# Patient Record
Sex: FEMALE | NOT HISPANIC OR LATINO | URBAN - METROPOLITAN AREA
[De-identification: names, ages, dates, MRNs, and addresses within clinical notes are randomized per-mention and may not be internally consistent; named-entity substitution may affect disease eponyms.]

---

## 2023-05-08 ENCOUNTER — OFFICE VISIT (OUTPATIENT)
Dept: PRIMARY CARE | Facility: CLINIC | Age: 68
End: 2023-05-08
Payer: MEDICARE

## 2023-05-08 VITALS
DIASTOLIC BLOOD PRESSURE: 70 MMHG | BODY MASS INDEX: 24.34 KG/M2 | SYSTOLIC BLOOD PRESSURE: 146 MMHG | WEIGHT: 170 LBS | HEIGHT: 70 IN

## 2023-05-08 DIAGNOSIS — R31.9 HEMATURIA, UNSPECIFIED TYPE: Primary | ICD-10-CM

## 2023-05-08 DIAGNOSIS — R10.9 ABDOMINAL PAIN, UNSPECIFIED ABDOMINAL LOCATION: ICD-10-CM

## 2023-05-08 DIAGNOSIS — M79.3 PANNICULITIS: ICD-10-CM

## 2023-05-08 DIAGNOSIS — M67.431 GANGLION CYST OF DORSUM OF RIGHT WRIST: ICD-10-CM

## 2023-05-08 PROBLEM — R53.83 FATIGUE: Status: ACTIVE | Noted: 2023-05-08

## 2023-05-08 PROBLEM — E03.9 HYPOTHYROIDISM: Status: ACTIVE | Noted: 2023-05-08

## 2023-05-08 PROBLEM — R42 DIZZINESS: Status: ACTIVE | Noted: 2023-05-08

## 2023-05-08 PROBLEM — K11.20 PAROTITIS: Status: ACTIVE | Noted: 2023-05-08

## 2023-05-08 PROBLEM — E78.5 HYPERLIPIDEMIA: Status: ACTIVE | Noted: 2023-05-08

## 2023-05-08 PROBLEM — K21.9 ESOPHAGEAL REFLUX: Status: ACTIVE | Noted: 2023-05-08

## 2023-05-08 PROBLEM — F41.9 ANXIETY: Status: ACTIVE | Noted: 2023-05-08

## 2023-05-08 PROBLEM — N39.0 UTI (URINARY TRACT INFECTION): Status: ACTIVE | Noted: 2023-05-08

## 2023-05-08 PROBLEM — M19.91 PRIMARY LOCALIZED OSTEOARTHRITIS: Status: ACTIVE | Noted: 2023-05-08

## 2023-05-08 PROBLEM — N89.8 VAGINAL DISCHARGE: Status: ACTIVE | Noted: 2023-05-08

## 2023-05-08 PROBLEM — R42 LIGHTHEADEDNESS: Status: ACTIVE | Noted: 2023-05-08

## 2023-05-08 PROBLEM — M19.049 ARTHRITIS OF HAND: Status: ACTIVE | Noted: 2023-05-08

## 2023-05-08 PROBLEM — M89.9 DISORDER OF BONE AND ARTICULAR CARTILAGE: Status: ACTIVE | Noted: 2023-05-08

## 2023-05-08 PROBLEM — I10 HYPERTENSION: Status: ACTIVE | Noted: 2023-05-08

## 2023-05-08 PROBLEM — J20.9 ACUTE BRONCHITIS: Status: ACTIVE | Noted: 2023-05-08

## 2023-05-08 PROBLEM — B00.1 HERPES LABIALIS: Status: ACTIVE | Noted: 2023-05-08

## 2023-05-08 PROBLEM — M81.0 AGE RELATED OSTEOPOROSIS: Status: ACTIVE | Noted: 2023-05-08

## 2023-05-08 PROBLEM — R73.01 IMPAIRED FASTING GLUCOSE: Status: ACTIVE | Noted: 2023-05-08

## 2023-05-08 PROBLEM — K64.8 INTERNAL HEMORRHOIDS: Status: ACTIVE | Noted: 2023-05-08

## 2023-05-08 PROBLEM — M25.512 SHOULDER PAIN, LEFT: Status: ACTIVE | Noted: 2023-05-08

## 2023-05-08 PROBLEM — M79.645 PAIN OF LEFT THUMB: Status: ACTIVE | Noted: 2023-05-08

## 2023-05-08 PROBLEM — R63.5 WEIGHT GAIN: Status: ACTIVE | Noted: 2023-05-08

## 2023-05-08 PROBLEM — R93.0 ABNORMAL HEAD CT: Status: ACTIVE | Noted: 2023-05-08

## 2023-05-08 PROBLEM — G56.03 BILATERAL CARPAL TUNNEL SYNDROME: Status: ACTIVE | Noted: 2023-05-08

## 2023-05-08 PROBLEM — M19.042 ARTHRITIS OF HAND, LEFT, DEGENERATIVE: Status: ACTIVE | Noted: 2023-05-08

## 2023-05-08 PROBLEM — I25.10 CORONARY ARTERY ARTERIOSCLEROSIS: Status: ACTIVE | Noted: 2023-05-08

## 2023-05-08 PROBLEM — M94.9 DISORDER OF BONE AND ARTICULAR CARTILAGE: Status: ACTIVE | Noted: 2023-05-08

## 2023-05-08 PROBLEM — B00.9 HSV (HERPES SIMPLEX VIRUS) INFECTION: Status: ACTIVE | Noted: 2023-05-08

## 2023-05-08 PROBLEM — R21 RASH: Status: ACTIVE | Noted: 2023-05-08

## 2023-05-08 PROBLEM — S93.401A SPRAIN OF ANKLE, RIGHT: Status: ACTIVE | Noted: 2023-05-08

## 2023-05-08 PROBLEM — R63.4 WEIGHT LOSS: Status: ACTIVE | Noted: 2023-05-08

## 2023-05-08 PROBLEM — M79.606 LOWER EXTREMITY PAIN: Status: ACTIVE | Noted: 2023-05-08

## 2023-05-08 PROBLEM — I25.9 ASYMPTOMATIC CORONARY HEART DISEASE: Status: ACTIVE | Noted: 2023-05-08

## 2023-05-08 PROBLEM — J30.2 SEASONAL ALLERGIC RHINITIS: Status: ACTIVE | Noted: 2023-05-08

## 2023-05-08 PROBLEM — N95.1 POST MENOPAUSAL SYNDROME: Status: ACTIVE | Noted: 2023-05-08

## 2023-05-08 PROBLEM — M19.049 OSTEOARTHRITIS, HAND: Status: ACTIVE | Noted: 2023-05-08

## 2023-05-08 PROBLEM — R52 GENERALIZED BODY ACHES: Status: ACTIVE | Noted: 2023-05-08

## 2023-05-08 PROBLEM — M79.7 FIBROMYALGIA SYNDROME: Status: ACTIVE | Noted: 2023-05-08

## 2023-05-08 PROBLEM — R79.89 ABNORMAL LFTS (LIVER FUNCTION TESTS): Status: ACTIVE | Noted: 2023-05-08

## 2023-05-08 PROBLEM — F32.A DEPRESSION: Status: ACTIVE | Noted: 2023-05-08

## 2023-05-08 PROBLEM — K63.5 BENIGN COLON POLYP: Status: ACTIVE | Noted: 2023-05-08

## 2023-05-08 PROBLEM — J06.9 UPPER RESPIRATORY INFECTION: Status: ACTIVE | Noted: 2023-05-08

## 2023-05-08 PROBLEM — R55 SYNCOPE: Status: ACTIVE | Noted: 2023-05-08

## 2023-05-08 PROBLEM — R93.1 ELEVATED CORONARY ARTERY CALCIUM SCORE: Status: ACTIVE | Noted: 2023-05-08

## 2023-05-08 LAB
POC APPEARANCE, URINE: CLEAR
POC BILIRUBIN, URINE: NEGATIVE
POC BLOOD, URINE: ABNORMAL
POC COLOR, URINE: YELLOW
POC GLUCOSE, URINE: NEGATIVE MG/DL
POC KETONES, URINE: NEGATIVE MG/DL
POC LEUKOCYTES, URINE: NEGATIVE
POC NITRITE,URINE: NEGATIVE
POC PH, URINE: 5.5 PH
POC PROTEIN, URINE: NEGATIVE MG/DL
POC SPECIFIC GRAVITY, URINE: <=1.005
POC UROBILINOGEN, URINE: 0.2 EU/DL

## 2023-05-08 PROCEDURE — 1159F MED LIST DOCD IN RCRD: CPT | Performed by: INTERNAL MEDICINE

## 2023-05-08 PROCEDURE — 81002 URINALYSIS NONAUTO W/O SCOPE: CPT | Performed by: INTERNAL MEDICINE

## 2023-05-08 PROCEDURE — 3077F SYST BP >= 140 MM HG: CPT | Performed by: INTERNAL MEDICINE

## 2023-05-08 PROCEDURE — 99214 OFFICE O/P EST MOD 30 MIN: CPT | Performed by: INTERNAL MEDICINE

## 2023-05-08 PROCEDURE — 3078F DIAST BP <80 MM HG: CPT | Performed by: INTERNAL MEDICINE

## 2023-05-08 RX ORDER — LEVOTHYROXINE SODIUM 100 UG/1
100 TABLET ORAL DAILY
COMMUNITY
End: 2023-07-10 | Stop reason: ALTCHOICE

## 2023-05-08 RX ORDER — DULOXETIN HYDROCHLORIDE 60 MG/1
60 CAPSULE, DELAYED RELEASE ORAL DAILY
COMMUNITY
Start: 2023-04-24 | End: 2023-07-13 | Stop reason: SDUPTHER

## 2023-05-08 RX ORDER — MULTIVITAMIN
1 TABLET ORAL DAILY
COMMUNITY

## 2023-05-08 RX ORDER — ATORVASTATIN CALCIUM 20 MG/1
20 TABLET, FILM COATED ORAL DAILY
COMMUNITY
End: 2023-07-13 | Stop reason: SDUPTHER

## 2023-05-08 RX ORDER — CIPROFLOXACIN 500 MG/1
500 TABLET ORAL 2 TIMES DAILY
Qty: 20 TABLET | Refills: 0 | Status: SHIPPED | OUTPATIENT
Start: 2023-05-08 | End: 2023-05-18

## 2023-05-08 RX ORDER — METRONIDAZOLE 500 MG/1
500 TABLET ORAL 3 TIMES DAILY
Qty: 30 TABLET | Refills: 0 | Status: SHIPPED | OUTPATIENT
Start: 2023-05-08 | End: 2023-05-18

## 2023-05-08 RX ORDER — VALACYCLOVIR HYDROCHLORIDE 1 G/1
TABLET, FILM COATED ORAL
COMMUNITY
Start: 2020-02-04 | End: 2023-10-02 | Stop reason: ALTCHOICE

## 2023-05-08 RX ORDER — FLUTICASONE PROPIONATE 50 MCG
2 SPRAY, SUSPENSION (ML) NASAL DAILY
COMMUNITY
End: 2023-07-13 | Stop reason: SDUPTHER

## 2023-05-08 RX ORDER — ASPIRIN 81 MG/1
81 TABLET ORAL DAILY
COMMUNITY

## 2023-05-08 RX ORDER — OLMESARTAN MEDOXOMIL 20 MG/1
20 TABLET ORAL DAILY
COMMUNITY
End: 2023-07-13 | Stop reason: SDUPTHER

## 2023-05-08 RX ORDER — CETIRIZINE HYDROCHLORIDE 10 MG/1
1 TABLET ORAL DAILY
COMMUNITY
Start: 2018-06-05

## 2023-05-08 NOTE — PROGRESS NOTES
Subjective   Patient ID: Rebecca Trinidad is a 68 y.o. female who presents for Abdominal Pain (Rt side on going x5days).    HPI   Patient started complaining of having right-sided abdominal pain since Wednesday it was very sharp lasted for few hours since then it is just mild but not going away.  She is constipated no diarrhea denies any nausea vomiting.  Denies any urinary frequency or burning.  Denies any fever  Concerned about appendicitis  Complaining of cyst on the back of the right wrist    Past recap    Patient is here for follow-up on blood work  Needs medication refill  Follow-up on hypothyroidism hypertension high cholesterol  Patient is still taking Cymbalta and needs refill. She was using her daughter's prescription so did not prescription from us  Patient is also complaining of having abdominal cramping from time to time  She does have anxiety issues which makes cramping worse. Sometimes she gets diarrhea sometimes constipation. She does not eat much fiber in  She has history of colon polyps and overdue for colonoscopy    past recap  having pain in left shoulder , going on for a while   has fibromyalgia   no injury , pain in movement up and back   sometimes heavy lifting at working , lot of repititive work      She does not have insurance  Sometimes gets tingling in the lower lip even though she does not have a sore so she takes Valtrex and it helps  October 24 she went to urgent care because she was having dizziness told fluid in the ER treated with Z-Don her right ear was hurting still she is feeling dizzy she was off work for 1 week  She is still getting off balance and spinning sensation      Patient here to establish  Needs refills on medications  Follow-up on hypothyroidism hypertension high cholesterol  Patient does not have insurance does not want you out of work  She will have insurance next year    Past medical history: Fibromyalgia, colon polyp, osteoarthritis, Hashimoto's disease,  "hypothyroidism, hypertension, high cholesterol, right ankle surgery, right thumb surgery, carpal tunnel surgery  Social history: Smokes drinks moderate alcohol  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   Constitutional:       Appearance: Normal appearance.   HENT:      Head: Normocephalic and atraumatic.      Right Ear: Tympanic membrane, ear canal and external ear normal.      Left Ear: Tympanic membrane, ear canal and external ear normal.      Nose: Nose normal.      Mouth/Throat:      Pharynx: Oropharynx is clear.   Eyes:      Extraocular Movements: Extraocular movements intact.      Conjunctiva/sclera: Conjunctivae normal.      Pupils: Pupils are equal, round, and reactive to light.   Cardiovascular:      Rate and Rhythm: Normal rate and regular rhythm.      Pulses: Normal pulses.      Heart sounds: Normal heart sounds.   Pulmonary:      Effort: Pulmonary effort is normal.      Breath sounds: Normal breath sounds.   Abdominal:      General: Abdomen is flat. Bowel sounds are normal.      Palpations: Abdomen is soft.      Tenderness: There is abdominal tenderness.      Comments: Tenderness in right lower quadrant   Musculoskeletal:      Cervical back: Normal range of motion and neck supple.   Skin:     General: Skin is warm and dry.   Neurological:      General: No focal deficit present.      Mental Status: She is alert and oriented to person, place, and time.   Psychiatric:         Mood and Affect: Mood normal.         Assessment/Plan   Problem List Items Addressed This Visit          Nervous    Abdominal pain    Relevant Medications    ciprofloxacin (Cipro) 500 mg tablet    metroNIDAZOLE (Flagyl) 500 mg tablet    Other Relevant Orders    POCT UA (nonautomated) manually resulted " (Completed)    CT abdomen pelvis wo IV contrast (Completed)       Other    Hematuria - Primary    Relevant Orders    CT abdomen pelvis wo IV contrast (Completed)     Other Visit Diagnoses       Panniculitis        Relevant Medications    ciprofloxacin (Cipro) 500 mg tablet    metroNIDAZOLE (Flagyl) 500 mg tablet    Ganglion cyst of dorsum of right wrist                 4/11  Mammogram ordered  Blood work ordered  EKG done shows normal sinus rhythm  Colonoscopy ordered  Pneumonia vaccine ordered  Shingles vaccine ordered  Blood work ordered CBC CMP fasting with TSH  X-ray of the left shoulder ordered  Reviewed x-ray results no osteoarthritis  Symptoms most likely from biceps tendinitis  Discussed stretching exercises  Medications refilled for 3 months  Follow-up after the testing    7/18  CT cardiac scoring showed score of more than 300  Patient is still smoking  Discussed risk factors for heart disease  Advised to quit smoking  Ultrasound kidneys normal  X-ray shoulder shows arthritis  Advised to continue shoulder therapy  Blood work is all in range  Thyroid in range  Blood pressure stable  Refills given on allergy medication    1/23/23   blood pressure stable  Blood work reviewed  Cholesterol well controlled  Colonoscopy results reviewed  No diverticular disease  Symptoms most likely IBS  Advised to increase fiber in the diet  Refer to screening colonoscopy  Refills given on Cymbalta  Follow-up blood work in 6 months     5/8/2023  Urine analysis done shows blood  Will get CT of the abdomen and pelvis stat  CAT scan reviewed no hematuria  Patient has panniculitis which can be causing the pain  With Cipro and Flagyl  Severe constipation  Discussed stool softeners and bowel regimen to help with the  6 cyst on the wrist is ganglion cyst benign

## 2023-07-08 LAB
ALANINE AMINOTRANSFERASE (SGPT) (U/L) IN SER/PLAS: 24 U/L (ref 7–45)
ALBUMIN (G/DL) IN SER/PLAS: 4.5 G/DL (ref 3.4–5)
ALKALINE PHOSPHATASE (U/L) IN SER/PLAS: 64 U/L (ref 33–136)
ANION GAP IN SER/PLAS: 9 MMOL/L (ref 10–20)
ASPARTATE AMINOTRANSFERASE (SGOT) (U/L) IN SER/PLAS: 22 U/L (ref 9–39)
BILIRUBIN TOTAL (MG/DL) IN SER/PLAS: 0.6 MG/DL (ref 0–1.2)
CALCIUM (MG/DL) IN SER/PLAS: 10.2 MG/DL (ref 8.6–10.6)
CARBON DIOXIDE, TOTAL (MMOL/L) IN SER/PLAS: 30 MMOL/L (ref 21–32)
CHLORIDE (MMOL/L) IN SER/PLAS: 102 MMOL/L (ref 98–107)
CHOLESTEROL (MG/DL) IN SER/PLAS: 157 MG/DL (ref 0–199)
CHOLESTEROL IN HDL (MG/DL) IN SER/PLAS: 52.7 MG/DL
CHOLESTEROL/HDL RATIO: 3
CREATININE (MG/DL) IN SER/PLAS: 0.97 MG/DL (ref 0.5–1.05)
GFR FEMALE: 64 ML/MIN/1.73M2
GLUCOSE (MG/DL) IN SER/PLAS: 101 MG/DL (ref 74–99)
LDL: 80 MG/DL (ref 0–99)
POTASSIUM (MMOL/L) IN SER/PLAS: 4.4 MMOL/L (ref 3.5–5.3)
PROTEIN TOTAL: 6.9 G/DL (ref 6.4–8.2)
SODIUM (MMOL/L) IN SER/PLAS: 137 MMOL/L (ref 136–145)
THYROTROPIN (MIU/L) IN SER/PLAS BY DETECTION LIMIT <= 0.05 MIU/L: 0.12 MIU/L (ref 0.44–3.98)
TRIGLYCERIDE (MG/DL) IN SER/PLAS: 123 MG/DL (ref 0–149)
UREA NITROGEN (MG/DL) IN SER/PLAS: 17 MG/DL (ref 6–23)
VLDL: 25 MG/DL (ref 0–40)

## 2023-07-10 ENCOUNTER — OFFICE VISIT (OUTPATIENT)
Dept: PRIMARY CARE | Facility: CLINIC | Age: 68
End: 2023-07-10
Payer: MEDICARE

## 2023-07-10 VITALS
SYSTOLIC BLOOD PRESSURE: 132 MMHG | HEIGHT: 70 IN | WEIGHT: 165 LBS | BODY MASS INDEX: 23.62 KG/M2 | DIASTOLIC BLOOD PRESSURE: 64 MMHG

## 2023-07-10 DIAGNOSIS — I10 PRIMARY HYPERTENSION: ICD-10-CM

## 2023-07-10 DIAGNOSIS — K59.09 OTHER CONSTIPATION: ICD-10-CM

## 2023-07-10 DIAGNOSIS — E03.9 HYPOTHYROIDISM, UNSPECIFIED TYPE: Primary | ICD-10-CM

## 2023-07-10 DIAGNOSIS — E78.2 MIXED HYPERLIPIDEMIA: ICD-10-CM

## 2023-07-10 PROCEDURE — 3075F SYST BP GE 130 - 139MM HG: CPT | Performed by: INTERNAL MEDICINE

## 2023-07-10 PROCEDURE — 99213 OFFICE O/P EST LOW 20 MIN: CPT | Performed by: INTERNAL MEDICINE

## 2023-07-10 PROCEDURE — 1159F MED LIST DOCD IN RCRD: CPT | Performed by: INTERNAL MEDICINE

## 2023-07-10 PROCEDURE — 3078F DIAST BP <80 MM HG: CPT | Performed by: INTERNAL MEDICINE

## 2023-07-12 ENCOUNTER — TELEPHONE (OUTPATIENT)
Dept: PRIMARY CARE | Facility: CLINIC | Age: 68
End: 2023-07-12
Payer: MEDICARE

## 2023-07-12 DIAGNOSIS — E78.5 HYPERLIPIDEMIA, UNSPECIFIED HYPERLIPIDEMIA TYPE: ICD-10-CM

## 2023-07-12 DIAGNOSIS — J30.2 SEASONAL ALLERGIC RHINITIS, UNSPECIFIED TRIGGER: ICD-10-CM

## 2023-07-12 DIAGNOSIS — F41.9 ANXIETY: ICD-10-CM

## 2023-07-12 DIAGNOSIS — E03.9 HYPOTHYROIDISM, UNSPECIFIED TYPE: ICD-10-CM

## 2023-07-12 DIAGNOSIS — I10 HYPERTENSION, UNSPECIFIED TYPE: ICD-10-CM

## 2023-07-12 DIAGNOSIS — F32.A DEPRESSION, UNSPECIFIED DEPRESSION TYPE: ICD-10-CM

## 2023-07-13 RX ORDER — FLUTICASONE PROPIONATE 50 MCG
2 SPRAY, SUSPENSION (ML) NASAL DAILY
Qty: 16 G | Refills: 0 | Status: SHIPPED | OUTPATIENT
Start: 2023-07-13 | End: 2023-12-18 | Stop reason: SDUPTHER

## 2023-07-13 RX ORDER — DULOXETIN HYDROCHLORIDE 60 MG/1
60 CAPSULE, DELAYED RELEASE ORAL DAILY
Qty: 90 CAPSULE | Refills: 0 | Status: SHIPPED | OUTPATIENT
Start: 2023-07-13 | End: 2023-09-30 | Stop reason: SDUPTHER

## 2023-07-13 RX ORDER — OLMESARTAN MEDOXOMIL 20 MG/1
20 TABLET ORAL DAILY
Qty: 90 TABLET | Refills: 0 | Status: SHIPPED | OUTPATIENT
Start: 2023-07-13 | End: 2023-09-30 | Stop reason: SDUPTHER

## 2023-07-13 RX ORDER — ATORVASTATIN CALCIUM 20 MG/1
20 TABLET, FILM COATED ORAL DAILY
Qty: 90 TABLET | Refills: 0 | Status: SHIPPED | OUTPATIENT
Start: 2023-07-13 | End: 2023-09-30 | Stop reason: SDUPTHER

## 2023-07-13 RX ORDER — LEVOTHYROXINE SODIUM 88 UG/1
88 TABLET ORAL DAILY
Qty: 90 TABLET | Refills: 0 | Status: SHIPPED | OUTPATIENT
Start: 2023-07-13 | End: 2023-10-02 | Stop reason: SDUPTHER

## 2023-07-13 RX ORDER — LEVOTHYROXINE SODIUM 88 UG/1
88 TABLET ORAL DAILY
Qty: 30 TABLET | Refills: 2 | Status: SHIPPED | OUTPATIENT
Start: 2023-07-13 | End: 2023-10-02 | Stop reason: ALTCHOICE

## 2023-07-31 NOTE — PROGRESS NOTES
Subjective   Patient ID: Rebecca Trinidad is a 68 y.o. female who presents for Follow-up and Med Refill.    Med Refill    Patient is here for follow-up  Has multiple complaints  Complaining of hair falling  Complaining of feeling very fatigued  Complaining of right-sided abdominal pain  She does have problems with constipation     Past recap   patient started complaining of having right-sided abdominal pain since Wednesday it was very sharp lasted for few hours since then it is just mild but not going away.  She is constipated no diarrhea denies any nausea vomiting.  Denies any urinary frequency or burning.  Denies any fever  Concerned about appendicitis  Complaining of cyst on the back of the right wrist     Patient is here for follow-up on blood work  Needs medication refill  Follow-up on hypothyroidism hypertension high cholesterol  Patient is still taking Cymbalta and needs refill. She was using her daughter's prescription so did not prescription from us  Patient is also complaining of having abdominal cramping from time to time  She does have anxiety issues which makes cramping worse. Sometimes she gets diarrhea sometimes constipation. She does not eat much fiber in  She has history of colon polyps and overdue for colonoscopy    having pain in left shoulder , going on for a while   has fibromyalgia   no injury , pain in movement up and back   sometimes heavy lifting at working , lot of repititive work      She does not have insurance  Sometimes gets tingling in the lower lip even though she does not have a sore so she takes Valtrex and it helps  October 24 she went to urgent care because she was having dizziness told fluid in the ER treated with Z-Don her right ear was hurting still she is feeling dizzy she was off work for 1 week  She is still getting off balance and spinning sensation        Patient here to establish  Needs refills on medications  Follow-up on hypothyroidism hypertension high  "cholesterol  Patient does not have insurance does not want you out of work  She will have insurance next year     Past medical history: Fibromyalgia, colon polyp, osteoarthritis, Hashimoto's disease, hypothyroidism, hypertension, high cholesterol, right ankle surgery, right thumb surgery, carpal tunnel surgery  Social history: Smokes drinks moderate alcohol  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   Constitutional:       Appearance: Normal appearance.   HENT:      Head: Normocephalic and atraumatic.      Right Ear: Tympanic membrane, ear canal and external ear normal.      Left Ear: Tympanic membrane, ear canal and external ear normal.      Nose: Nose normal.      Mouth/Throat:      Pharynx: Oropharynx is clear.   Eyes:      Extraocular Movements: Extraocular movements intact.      Conjunctiva/sclera: Conjunctivae normal.      Pupils: Pupils are equal, round, and reactive to light.   Cardiovascular:      Rate and Rhythm: Normal rate and regular rhythm.      Pulses: Normal pulses.      Heart sounds: Normal heart sounds.   Pulmonary:      Effort: Pulmonary effort is normal.      Breath sounds: Normal breath sounds.   Abdominal:      General: Abdomen is flat. Bowel sounds are normal.      Palpations: Abdomen is soft.   Musculoskeletal:      Cervical back: Normal range of motion and neck supple.   Skin:     General: Skin is warm and dry.   Neurological:      General: No focal deficit present.      Mental Status: She is alert and oriented to person, place, and time.   Psychiatric:         Mood and Affect: Mood normal.         Assessment/Plan      4/11  Mammogram ordered  Blood work ordered  EKG done shows normal sinus rhythm  Colonoscopy ordered  Pneumonia vaccine ordered  Shingles " vaccine ordered  Blood work ordered CBC CMP fasting with TSH  X-ray of the left shoulder ordered  Reviewed x-ray results no osteoarthritis  Symptoms most likely from biceps tendinitis  Discussed stretching exercises  Medications refilled for 3 months  Follow-up after the testing     7/18  CT cardiac scoring showed score of more than 300  Patient is still smoking  Discussed risk factors for heart disease  Advised to quit smoking  Ultrasound kidneys normal  X-ray shoulder shows arthritis  Advised to continue shoulder therapy  Blood work is all in range  Thyroid in range  Blood pressure stable  Refills given on allergy medication     1/23/23   blood pressure stable  Blood work reviewed  Cholesterol well controlled  Colonoscopy results reviewed  No diverticular disease  Symptoms most likely IBS  Advised to increase fiber in the diet  Refer to screening colonoscopy  Refills given on Cymbalta  Follow-up blood work in 6 months      5/8/2023  Urine analysis done shows blood  Will get CT of the abdomen and pelvis stat  CAT scan reviewed no hematuria  Patient has panniculitis which can be causing the pain  With Cipro and Flagyl  Severe constipation  Discussed stool softeners and bowel regimen to help with the  6 cyst on the wrist is ganglion cyst benign       7/10/2023  Last CAT scan results reviewed  Patient had constipation  Advised patient to stay on Colace and MiraLAX every day and if that does not work we will give lactulose  Blood work reviewed  Cholesterol well controlled, CMP normal  TSH slightly suppressed at 0.12  We will keep same dose  Recheck at follow-up in 3 months

## 2023-09-25 ENCOUNTER — LAB (OUTPATIENT)
Dept: LAB | Facility: LAB | Age: 68
End: 2023-09-25
Payer: MEDICARE

## 2023-09-25 DIAGNOSIS — E03.9 HYPOTHYROIDISM, UNSPECIFIED TYPE: ICD-10-CM

## 2023-09-25 LAB
THYROTROPIN (MIU/L) IN SER/PLAS BY DETECTION LIMIT <= 0.05 MIU/L: 1.49 MIU/L (ref 0.44–3.98)
THYROXINE (T4) FREE (NG/DL) IN SER/PLAS: 1.3 NG/DL (ref 0.78–1.48)

## 2023-09-25 PROCEDURE — 84439 ASSAY OF FREE THYROXINE: CPT

## 2023-09-25 PROCEDURE — 36415 COLL VENOUS BLD VENIPUNCTURE: CPT

## 2023-09-25 PROCEDURE — 84443 ASSAY THYROID STIM HORMONE: CPT

## 2023-09-30 PROBLEM — I10 PRIMARY HYPERTENSION: Status: ACTIVE | Noted: 2023-09-30

## 2023-09-30 PROBLEM — R55 VASOVAGAL SYNCOPE: Status: ACTIVE | Noted: 2023-09-30

## 2023-09-30 PROBLEM — T50.905A ADVERSE REACTION TO DRUG: Status: ACTIVE | Noted: 2023-09-30

## 2023-09-30 PROBLEM — E78.5 DYSLIPIDEMIA: Status: ACTIVE | Noted: 2023-09-30

## 2023-09-30 PROBLEM — S61.419A LACERATION OF HAND: Status: ACTIVE | Noted: 2023-09-30

## 2023-09-30 PROBLEM — J45.909 ASTHMA (HHS-HCC): Status: ACTIVE | Noted: 2023-09-30

## 2023-10-02 ENCOUNTER — OFFICE VISIT (OUTPATIENT)
Dept: PRIMARY CARE | Facility: CLINIC | Age: 68
End: 2023-10-02
Payer: MEDICARE

## 2023-10-02 VITALS
SYSTOLIC BLOOD PRESSURE: 122 MMHG | BODY MASS INDEX: 24.2 KG/M2 | HEIGHT: 70 IN | WEIGHT: 169 LBS | DIASTOLIC BLOOD PRESSURE: 64 MMHG

## 2023-10-02 DIAGNOSIS — B00.1 HERPES LABIALIS: ICD-10-CM

## 2023-10-02 DIAGNOSIS — I10 HYPERTENSION, UNSPECIFIED TYPE: ICD-10-CM

## 2023-10-02 DIAGNOSIS — Z23 ENCOUNTER FOR IMMUNIZATION: ICD-10-CM

## 2023-10-02 DIAGNOSIS — E78.5 HYPERLIPIDEMIA, UNSPECIFIED HYPERLIPIDEMIA TYPE: ICD-10-CM

## 2023-10-02 DIAGNOSIS — F41.9 ANXIETY: ICD-10-CM

## 2023-10-02 DIAGNOSIS — A60.00 GENITAL HERPES SIMPLEX, UNSPECIFIED SITE: Primary | ICD-10-CM

## 2023-10-02 DIAGNOSIS — E03.9 HYPOTHYROIDISM, UNSPECIFIED TYPE: ICD-10-CM

## 2023-10-02 DIAGNOSIS — F32.A DEPRESSION, UNSPECIFIED DEPRESSION TYPE: ICD-10-CM

## 2023-10-02 PROCEDURE — 1159F MED LIST DOCD IN RCRD: CPT | Performed by: INTERNAL MEDICINE

## 2023-10-02 PROCEDURE — G0009 ADMIN PNEUMOCOCCAL VACCINE: HCPCS | Performed by: INTERNAL MEDICINE

## 2023-10-02 PROCEDURE — 3078F DIAST BP <80 MM HG: CPT | Performed by: INTERNAL MEDICINE

## 2023-10-02 PROCEDURE — 3074F SYST BP LT 130 MM HG: CPT | Performed by: INTERNAL MEDICINE

## 2023-10-02 PROCEDURE — 90677 PCV20 VACCINE IM: CPT | Performed by: INTERNAL MEDICINE

## 2023-10-02 PROCEDURE — 99214 OFFICE O/P EST MOD 30 MIN: CPT | Performed by: INTERNAL MEDICINE

## 2023-10-02 RX ORDER — ATORVASTATIN CALCIUM 20 MG/1
20 TABLET, FILM COATED ORAL DAILY
Qty: 90 TABLET | Refills: 0 | Status: SHIPPED | OUTPATIENT
Start: 2023-10-02 | End: 2023-12-18 | Stop reason: SDUPTHER

## 2023-10-02 RX ORDER — DULOXETIN HYDROCHLORIDE 60 MG/1
60 CAPSULE, DELAYED RELEASE ORAL DAILY
Qty: 90 CAPSULE | Refills: 0 | Status: SHIPPED | OUTPATIENT
Start: 2023-10-02 | End: 2023-12-18 | Stop reason: SDUPTHER

## 2023-10-02 RX ORDER — VALACYCLOVIR HYDROCHLORIDE 500 MG/1
500 TABLET, FILM COATED ORAL 2 TIMES DAILY
Qty: 6 TABLET | Refills: 2 | Status: SHIPPED | OUTPATIENT
Start: 2023-10-02 | End: 2023-10-05

## 2023-10-02 RX ORDER — VALACYCLOVIR HYDROCHLORIDE 1 G/1
1000 TABLET, FILM COATED ORAL 3 TIMES DAILY
Qty: 9 TABLET | Refills: 1 | Status: CANCELLED | OUTPATIENT
Start: 2023-10-02

## 2023-10-02 RX ORDER — LEVOTHYROXINE SODIUM 88 UG/1
88 TABLET ORAL DAILY
Qty: 90 TABLET | Refills: 0 | Status: SHIPPED | OUTPATIENT
Start: 2023-10-02 | End: 2023-12-18 | Stop reason: ALTCHOICE

## 2023-10-02 RX ORDER — OLMESARTAN MEDOXOMIL 20 MG/1
20 TABLET ORAL DAILY
Qty: 90 TABLET | Refills: 0 | Status: SHIPPED | OUTPATIENT
Start: 2023-10-02 | End: 2023-12-18 | Stop reason: SDUPTHER

## 2023-10-02 ASSESSMENT — ENCOUNTER SYMPTOMS
LOSS OF SENSATION IN FEET: 0
OCCASIONAL FEELINGS OF UNSTEADINESS: 0
DEPRESSION: 0

## 2023-10-02 NOTE — PROGRESS NOTES
"Subjective   Patient ID: Rebecca Trinidad is a 68 y.o. female who presents for Follow-up and Med Refill.    Med Refill  Patient is here for follow-up on hypothyroidism  With dose adjustment she is feeling little better here is not following as much but she still feels very tired  She needs refills on Valtrex she has history of genital herpes\"   Needs refill on Cymbalta helps with her anxiety and depression    patient is here for follow-up  Has multiple complaints  Complaining of hair falling  Complaining of feeling very fatigued  Complaining of right-sided abdominal pain  She does have problems with constipation      Past recap   patient started complaining of having right-sided abdominal pain since Wednesday it was very sharp lasted for few hours since then it is just mild but not going away.  She is constipated no diarrhea denies any nausea vomiting.  Denies any urinary frequency or burning.  Denies any fever  Concerned about appendicitis  Complaining of cyst on the back of the right wrist     Patient is here for follow-up on blood work  Needs medication refill  Follow-up on hypothyroidism hypertension high cholesterol  Patient is still taking Cymbalta and needs refill. She was using her daughter's prescription so did not prescription from us  Patient is also complaining of having abdominal cramping from time to time  She does have anxiety issues which makes cramping worse. Sometimes she gets diarrhea sometimes constipation. She does not eat much fiber in  She has history of colon polyps and overdue for colonoscopy     having pain in left shoulder , going on for a while   has fibromyalgia   no injury , pain in movement up and back   sometimes heavy lifting at working , lot of repititive work      She does not have insurance  Sometimes gets tingling in the lower lip even though she does not have a sore so she takes Valtrex and it helps  October 24 she went to urgent care because she was having dizziness told fluid " "in the ER treated with Z-Don her right ear was hurting still she is feeling dizzy she was off work for 1 week  She is still getting off balance and spinning sensation        Patient here to establish  Needs refills on medications  Follow-up on hypothyroidism hypertension high cholesterol  Patient does not have insurance does not want you out of work  She will have insurance next year     Past medical history: Fibromyalgia, colon polyp, osteoarthritis, Hashimoto's disease, hypothyroidism, hypertension, high cholesterol, right ankle surgery, right thumb surgery, carpal tunnel surgery, genital herpes  Social history: Smokes drinks moderate alcohol  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   Constitutional:       Appearance: Normal appearance.   HENT:      Head: Normocephalic and atraumatic.      Right Ear: Tympanic membrane, ear canal and external ear normal.      Left Ear: Tympanic membrane, ear canal and external ear normal.      Nose: Nose normal.      Mouth/Throat:      Pharynx: Oropharynx is clear.   Eyes:      Extraocular Movements: Extraocular movements intact.      Conjunctiva/sclera: Conjunctivae normal.      Pupils: Pupils are equal, round, and reactive to light.   Cardiovascular:      Rate and Rhythm: Normal rate and regular rhythm.      Pulses: Normal pulses.      Heart sounds: Normal heart sounds.   Pulmonary:      Effort: Pulmonary effort is normal.      Breath sounds: Normal breath sounds.   Abdominal:      General: Abdomen is flat. Bowel sounds are normal.      Palpations: Abdomen is soft.   Musculoskeletal:      Cervical back: Normal range of motion and neck supple.   Skin:     General: Skin is warm and dry.   Neurological:      General: No focal deficit present.      " Mental Status: She is alert and oriented to person, place, and time.   Psychiatric:         Mood and Affect: Mood normal.       Assessment/Plan   Problem List Items Addressed This Visit             ICD-10-CM       Cardiac and Vasculature    Hyperlipidemia E78.5    Relevant Medications    atorvastatin (Lipitor) 20 mg tablet    Other Relevant Orders    CBC    Comprehensive Metabolic Panel    Lipid Panel    Thyroid Stimulating Hormone    Hypertension I10    Relevant Medications    olmesartan (BENIcar) 20 mg tablet    Other Relevant Orders    CBC    Comprehensive Metabolic Panel    Lipid Panel    Thyroid Stimulating Hormone       Endocrine/Metabolic    Hypothyroidism E03.9    Relevant Medications    Synthroid 88 mcg tablet    Other Relevant Orders    CBC    Comprehensive Metabolic Panel    Lipid Panel    Thyroid Stimulating Hormone       Infectious Diseases    Herpes labialis B00.1    Relevant Medications    valACYclovir (Valtrex) 500 mg tablet       Mental Health    Anxiety F41.9    Relevant Medications    DULoxetine (Cymbalta) 60 mg DR capsule    Other Relevant Orders    CBC    Comprehensive Metabolic Panel    Lipid Panel    Thyroid Stimulating Hormone    Depression F32.A    Relevant Medications    DULoxetine (Cymbalta) 60 mg DR capsule    Other Relevant Orders    CBC    Comprehensive Metabolic Panel    Lipid Panel    Thyroid Stimulating Hormone     Other Visit Diagnoses         Codes    Genital herpes simplex, unspecified site    -  Primary A60.00    Relevant Medications    valACYclovir (Valtrex) 500 mg tablet    Encounter for immunization     Z23    Relevant Orders    Pneumococcal conjugate vaccine, 20-valent (PREVNAR 20) (Completed)           4/11  Mammogram ordered  Blood work ordered  EKG done shows normal sinus rhythm  Colonoscopy ordered  Pneumonia vaccine ordered  Shingles vaccine ordered  Blood work ordered CBC CMP fasting with TSH  X-ray of the left shoulder ordered  Reviewed x-ray results no  osteoarthritis  Symptoms most likely from biceps tendinitis  Discussed stretching exercises  Medications refilled for 3 months  Follow-up after the testing     7/18  CT cardiac scoring showed score of more than 300  Patient is still smoking  Discussed risk factors for heart disease  Advised to quit smoking  Ultrasound kidneys normal  X-ray shoulder shows arthritis  Advised to continue shoulder therapy  Blood work is all in range  Thyroid in range  Blood pressure stable  Refills given on allergy medication     1/23/23   blood pressure stable  Blood work reviewed  Cholesterol well controlled  Colonoscopy results reviewed  No diverticular disease  Symptoms most likely IBS  Advised to increase fiber in the diet  Refer to screening colonoscopy  Refills given on Cymbalta  Follow-up blood work in 6 months      5/8/2023  Urine analysis done shows blood  Will get CT of the abdomen and pelvis stat  CAT scan reviewed no hematuria  Patient has panniculitis which can be causing the pain  With Cipro and Flagyl  Severe constipation  Discussed stool softeners and bowel regimen to help with the  6 cyst on the wrist is ganglion cyst benign        7/10/2023  Last CAT scan results reviewed  Patient had constipation  Advised patient to stay on Colace and MiraLAX every day and if that does not work we will give lactulose  Blood work reviewed  Cholesterol well controlled, CMP normal  TSH slightly suppressed at 0.12  We will keep same dose  Recheck at follow-up in 3 months    10/2/2023  Thyroid is in range  Medication refill  Cholesterol well controlled  Cymbalta refilled  Valtrex prescription given  Not sure cause for her fatigue patient says she has fibromyalgia  Encourage patient to do exercises encouraged to do swimming which will help her fatigue and fibromyalgia symptoms  Follow-up in 6 months

## 2023-12-11 ENCOUNTER — LAB (OUTPATIENT)
Dept: LAB | Facility: LAB | Age: 68
End: 2023-12-11
Payer: MEDICARE

## 2023-12-11 DIAGNOSIS — E03.9 HYPOTHYROIDISM, UNSPECIFIED TYPE: ICD-10-CM

## 2023-12-11 DIAGNOSIS — F41.9 ANXIETY: ICD-10-CM

## 2023-12-11 DIAGNOSIS — I10 HYPERTENSION, UNSPECIFIED TYPE: ICD-10-CM

## 2023-12-11 DIAGNOSIS — E78.5 HYPERLIPIDEMIA, UNSPECIFIED HYPERLIPIDEMIA TYPE: ICD-10-CM

## 2023-12-11 DIAGNOSIS — F32.A DEPRESSION, UNSPECIFIED DEPRESSION TYPE: ICD-10-CM

## 2023-12-11 LAB
ALBUMIN SERPL BCP-MCNC: 4.5 G/DL (ref 3.4–5)
ALP SERPL-CCNC: 60 U/L (ref 33–136)
ALT SERPL W P-5'-P-CCNC: 26 U/L (ref 7–45)
ANION GAP SERPL CALC-SCNC: 13 MMOL/L (ref 10–20)
AST SERPL W P-5'-P-CCNC: 24 U/L (ref 9–39)
BILIRUB SERPL-MCNC: 0.6 MG/DL (ref 0–1.2)
BUN SERPL-MCNC: 21 MG/DL (ref 6–23)
CALCIUM SERPL-MCNC: 10.3 MG/DL (ref 8.6–10.6)
CHLORIDE SERPL-SCNC: 100 MMOL/L (ref 98–107)
CHOLEST SERPL-MCNC: 176 MG/DL (ref 0–199)
CHOLESTEROL/HDL RATIO: 3.1
CO2 SERPL-SCNC: 29 MMOL/L (ref 21–32)
CREAT SERPL-MCNC: 1.09 MG/DL (ref 0.5–1.05)
ERYTHROCYTE [DISTWIDTH] IN BLOOD BY AUTOMATED COUNT: 13.3 % (ref 11.5–14.5)
GFR SERPL CREATININE-BSD FRML MDRD: 56 ML/MIN/1.73M*2
GLUCOSE SERPL-MCNC: 108 MG/DL (ref 74–99)
HCT VFR BLD AUTO: 38.5 % (ref 36–46)
HDLC SERPL-MCNC: 57.7 MG/DL
HGB BLD-MCNC: 12.8 G/DL (ref 12–16)
LDLC SERPL CALC-MCNC: 97 MG/DL
MCH RBC QN AUTO: 30.5 PG (ref 26–34)
MCHC RBC AUTO-ENTMCNC: 33.2 G/DL (ref 32–36)
MCV RBC AUTO: 92 FL (ref 80–100)
NON HDL CHOLESTEROL: 118 MG/DL (ref 0–149)
NRBC BLD-RTO: 0 /100 WBCS (ref 0–0)
PLATELET # BLD AUTO: 314 X10*3/UL (ref 150–450)
POTASSIUM SERPL-SCNC: 4.6 MMOL/L (ref 3.5–5.3)
PROT SERPL-MCNC: 7.2 G/DL (ref 6.4–8.2)
RBC # BLD AUTO: 4.19 X10*6/UL (ref 4–5.2)
SODIUM SERPL-SCNC: 137 MMOL/L (ref 136–145)
TRIGL SERPL-MCNC: 105 MG/DL (ref 0–149)
TSH SERPL-ACNC: 0.39 MIU/L (ref 0.44–3.98)
VLDL: 21 MG/DL (ref 0–40)
WBC # BLD AUTO: 6 X10*3/UL (ref 4.4–11.3)

## 2023-12-11 PROCEDURE — 84443 ASSAY THYROID STIM HORMONE: CPT

## 2023-12-11 PROCEDURE — 80053 COMPREHEN METABOLIC PANEL: CPT

## 2023-12-11 PROCEDURE — 36415 COLL VENOUS BLD VENIPUNCTURE: CPT

## 2023-12-11 PROCEDURE — 80061 LIPID PANEL: CPT

## 2023-12-11 PROCEDURE — 85027 COMPLETE CBC AUTOMATED: CPT

## 2023-12-18 ENCOUNTER — OFFICE VISIT (OUTPATIENT)
Dept: PRIMARY CARE | Facility: CLINIC | Age: 68
End: 2023-12-18
Payer: MEDICARE

## 2023-12-18 VITALS
HEIGHT: 70 IN | WEIGHT: 170 LBS | SYSTOLIC BLOOD PRESSURE: 116 MMHG | DIASTOLIC BLOOD PRESSURE: 56 MMHG | BODY MASS INDEX: 24.34 KG/M2

## 2023-12-18 DIAGNOSIS — F41.9 ANXIETY: ICD-10-CM

## 2023-12-18 DIAGNOSIS — I10 HYPERTENSION, UNSPECIFIED TYPE: ICD-10-CM

## 2023-12-18 DIAGNOSIS — J30.2 SEASONAL ALLERGIC RHINITIS, UNSPECIFIED TRIGGER: ICD-10-CM

## 2023-12-18 DIAGNOSIS — E78.5 HYPERLIPIDEMIA, UNSPECIFIED HYPERLIPIDEMIA TYPE: ICD-10-CM

## 2023-12-18 DIAGNOSIS — E03.9 HYPOTHYROIDISM, UNSPECIFIED TYPE: ICD-10-CM

## 2023-12-18 DIAGNOSIS — F32.A DEPRESSION, UNSPECIFIED DEPRESSION TYPE: ICD-10-CM

## 2023-12-18 PROCEDURE — 3074F SYST BP LT 130 MM HG: CPT | Performed by: INTERNAL MEDICINE

## 2023-12-18 PROCEDURE — 1159F MED LIST DOCD IN RCRD: CPT | Performed by: INTERNAL MEDICINE

## 2023-12-18 PROCEDURE — 99213 OFFICE O/P EST LOW 20 MIN: CPT | Performed by: INTERNAL MEDICINE

## 2023-12-18 PROCEDURE — 3078F DIAST BP <80 MM HG: CPT | Performed by: INTERNAL MEDICINE

## 2023-12-18 RX ORDER — DULOXETIN HYDROCHLORIDE 60 MG/1
60 CAPSULE, DELAYED RELEASE ORAL DAILY
Qty: 90 CAPSULE | Refills: 0 | Status: SHIPPED | OUTPATIENT
Start: 2023-12-18 | End: 2024-03-18 | Stop reason: SDUPTHER

## 2023-12-18 RX ORDER — OLMESARTAN MEDOXOMIL 20 MG/1
20 TABLET ORAL DAILY
Qty: 90 TABLET | Refills: 0 | Status: SHIPPED | OUTPATIENT
Start: 2023-12-18 | End: 2024-03-18 | Stop reason: SDUPTHER

## 2023-12-18 RX ORDER — LEVOTHYROXINE SODIUM 88 UG/1
88 TABLET ORAL DAILY
Qty: 90 TABLET | Refills: 0 | Status: CANCELLED | OUTPATIENT
Start: 2023-12-18 | End: 2024-03-17

## 2023-12-18 RX ORDER — ATORVASTATIN CALCIUM 20 MG/1
20 TABLET, FILM COATED ORAL DAILY
Qty: 90 TABLET | Refills: 0 | Status: SHIPPED | OUTPATIENT
Start: 2023-12-18 | End: 2024-03-18 | Stop reason: SDUPTHER

## 2023-12-18 RX ORDER — LEVOTHYROXINE SODIUM 75 UG/1
75 TABLET ORAL DAILY
Qty: 90 TABLET | Refills: 0 | Status: SHIPPED | OUTPATIENT
Start: 2023-12-18 | End: 2024-03-11 | Stop reason: SDUPTHER

## 2023-12-18 RX ORDER — FLUTICASONE PROPIONATE 50 MCG
2 SPRAY, SUSPENSION (ML) NASAL DAILY
Qty: 16 G | Refills: 0 | Status: SHIPPED | OUTPATIENT
Start: 2023-12-18 | End: 2024-06-10 | Stop reason: SDUPTHER

## 2023-12-18 ASSESSMENT — ENCOUNTER SYMPTOMS
DEPRESSION: 0
OCCASIONAL FEELINGS OF UNSTEADINESS: 0
LOSS OF SENSATION IN FEET: 0

## 2023-12-18 NOTE — PROGRESS NOTES
"Subjective   Patient ID: Rebecca Trinidad is a 68 y.o. female who presents for Follow-up and Med Refill.    Med Refill  Patient is here for follow-up on blood work  Follow-up on hypothyroidism, hypothyroidism, hypertension  Overall doing well    Patient is here for follow-up on hypothyroidism  With dose adjustment she is feeling little better here is not following as much but she still feels very tired  She needs refills on Valtrex she has history of genital herpes\"   Needs refill on Cymbalta helps with her anxiety and depression    patient is here for follow-up  Has multiple complaints  Complaining of hair falling  Complaining of feeling very fatigued  Complaining of right-sided abdominal pain  She does have problems with constipation      Past recap   patient started complaining of having right-sided abdominal pain since Wednesday it was very sharp lasted for few hours since then it is just mild but not going away.  She is constipated no diarrhea denies any nausea vomiting.  Denies any urinary frequency or burning.  Denies any fever  Concerned about appendicitis  Complaining of cyst on the back of the right wrist     Patient is here for follow-up on blood work  Needs medication refill  Follow-up on hypothyroidism hypertension high cholesterol  Patient is still taking Cymbalta and needs refill. She was using her daughter's prescription so did not prescription from us  Patient is also complaining of having abdominal cramping from time to time  She does have anxiety issues which makes cramping worse. Sometimes she gets diarrhea sometimes constipation. She does not eat much fiber in  She has history of colon polyps and overdue for colonoscopy     having pain in left shoulder , going on for a while   has fibromyalgia   no injury , pain in movement up and back   sometimes heavy lifting at working , lot of repititive work      She does not have insurance  Sometimes gets tingling in the lower lip even though she does not " "have a sore so she takes Valtrex and it helps  October 24 she went to urgent care because she was having dizziness told fluid in the ER treated with Z-Don her right ear was hurting still she is feeling dizzy she was off work for 1 week  She is still getting off balance and spinning sensation        Patient here to establish  Needs refills on medications  Follow-up on hypothyroidism hypertension high cholesterol  Patient does not have insurance does not want you out of work  She will have insurance next year     Past medical history: Fibromyalgia, colon polyp, osteoarthritis, Hashimoto's disease, hypothyroidism, hypertension, high cholesterol, right ankle surgery, right thumb surgery, carpal tunnel surgery, genital herpes  Social history: Smokes drinks moderate alcohol  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   Constitutional:       Appearance: Normal appearance.   HENT:      Head: Normocephalic and atraumatic.      Right Ear: Tympanic membrane, ear canal and external ear normal.      Left Ear: Tympanic membrane, ear canal and external ear normal.      Nose: Nose normal.      Mouth/Throat:      Pharynx: Oropharynx is clear.   Eyes:      Extraocular Movements: Extraocular movements intact.      Conjunctiva/sclera: Conjunctivae normal.      Pupils: Pupils are equal, round, and reactive to light.   Cardiovascular:      Rate and Rhythm: Normal rate and regular rhythm.      Pulses: Normal pulses.      Heart sounds: Normal heart sounds.   Pulmonary:      Effort: Pulmonary effort is normal.      Breath sounds: Normal breath sounds.   Abdominal:      General: Abdomen is flat. Bowel sounds are normal.      Palpations: Abdomen is soft.   Musculoskeletal:      Cervical back: Normal range of " motion and neck supple.   Skin:     General: Skin is warm and dry.   Neurological:      General: No focal deficit present.      Mental Status: She is alert and oriented to person, place, and time.   Psychiatric:         Mood and Affect: Mood normal.       Assessment/Plan   Problem List Items Addressed This Visit             ICD-10-CM       Cardiac and Vasculature    Hyperlipidemia E78.5    Relevant Orders    CBC    Comprehensive Metabolic Panel    Lipid Panel    Thyroid Stimulating Hormone    Hypertension I10    Relevant Orders    CBC    Comprehensive Metabolic Panel    Lipid Panel    Thyroid Stimulating Hormone       ENT    Seasonal allergic rhinitis J30.2    Relevant Orders    CBC    Comprehensive Metabolic Panel    Lipid Panel    Thyroid Stimulating Hormone       Endocrine/Metabolic    Hypothyroidism E03.9    Relevant Orders    CBC    Comprehensive Metabolic Panel    Lipid Panel    Thyroid Stimulating Hormone       Mental Health    Anxiety F41.9    Relevant Orders    CBC    Comprehensive Metabolic Panel    Lipid Panel    Thyroid Stimulating Hormone    Depression F32.A    Relevant Orders    CBC    Comprehensive Metabolic Panel    Lipid Panel    Thyroid Stimulating Hormone    4/11  Mammogram ordered  Blood work ordered  EKG done shows normal sinus rhythm  Colonoscopy ordered  Pneumonia vaccine ordered  Shingles vaccine ordered  Blood work ordered CBC CMP fasting with TSH  X-ray of the left shoulder ordered  Reviewed x-ray results no osteoarthritis  Symptoms most likely from biceps tendinitis  Discussed stretching exercises  Medications refilled for 3 months  Follow-up after the testing     7/18  CT cardiac scoring showed score of more than 300  Patient is still smoking  Discussed risk factors for heart disease  Advised to quit smoking  Ultrasound kidneys normal  X-ray shoulder shows arthritis  Advised to continue shoulder therapy  Blood work is all in range  Thyroid in range  Blood pressure stable  Refills given on  allergy medication     1/23/23   blood pressure stable  Blood work reviewed  Cholesterol well controlled  Colonoscopy results reviewed  No diverticular disease  Symptoms most likely IBS  Advised to increase fiber in the diet  Refer to screening colonoscopy  Refills given on Cymbalta  Follow-up blood work in 6 months      5/8/2023  Urine analysis done shows blood  Will get CT of the abdomen and pelvis stat  CAT scan reviewed no hematuria  Patient has panniculitis which can be causing the pain  With Cipro and Flagyl  Severe constipation  Discussed stool softeners and bowel regimen to help with the  6 cyst on the wrist is ganglion cyst benign        7/10/2023  Last CAT scan results reviewed  Patient had constipation  Advised patient to stay on Colace and MiraLAX every day and if that does not work we will give lactulose  Blood work reviewed  Cholesterol well controlled, CMP normal  TSH slightly suppressed at 0.12  We will keep same dose  Recheck at follow-up in 3 months    10/2/2023  Thyroid is in range  Medication refill  Cholesterol well controlled  Cymbalta refilled  Valtrex prescription given  Not sure cause for her fatigue patient says she has fibromyalgia  Encourage patient to do exercises encouraged to do swimming which will help her fatigue and fibromyalgia symptoms  Follow-up in 6 months    12/18/2023  Blood work reviewed  TSH still suppressed  Decrease levothyroxine 75 mcg  Cholesterol very well-controlled  Blood pressure well-controlled  Follow-up blood work in 3 months

## 2024-03-11 ENCOUNTER — LAB (OUTPATIENT)
Dept: LAB | Facility: LAB | Age: 69
End: 2024-03-11
Payer: MEDICARE

## 2024-03-11 DIAGNOSIS — E03.9 HYPOTHYROIDISM, UNSPECIFIED TYPE: ICD-10-CM

## 2024-03-11 DIAGNOSIS — F41.9 ANXIETY: ICD-10-CM

## 2024-03-11 DIAGNOSIS — F32.A DEPRESSION, UNSPECIFIED DEPRESSION TYPE: ICD-10-CM

## 2024-03-11 DIAGNOSIS — J30.2 SEASONAL ALLERGIC RHINITIS, UNSPECIFIED TRIGGER: ICD-10-CM

## 2024-03-11 DIAGNOSIS — I10 HYPERTENSION, UNSPECIFIED TYPE: ICD-10-CM

## 2024-03-11 DIAGNOSIS — E78.5 HYPERLIPIDEMIA, UNSPECIFIED HYPERLIPIDEMIA TYPE: ICD-10-CM

## 2024-03-11 LAB
ALBUMIN SERPL BCP-MCNC: 4.8 G/DL (ref 3.4–5)
ALP SERPL-CCNC: 58 U/L (ref 33–136)
ALT SERPL W P-5'-P-CCNC: 31 U/L (ref 7–45)
ANION GAP SERPL CALC-SCNC: 14 MMOL/L (ref 10–20)
AST SERPL W P-5'-P-CCNC: 27 U/L (ref 9–39)
BILIRUB SERPL-MCNC: 0.6 MG/DL (ref 0–1.2)
BUN SERPL-MCNC: 16 MG/DL (ref 6–23)
CALCIUM SERPL-MCNC: 10.2 MG/DL (ref 8.6–10.6)
CHLORIDE SERPL-SCNC: 99 MMOL/L (ref 98–107)
CHOLEST SERPL-MCNC: 198 MG/DL (ref 0–199)
CHOLESTEROL/HDL RATIO: 3
CO2 SERPL-SCNC: 29 MMOL/L (ref 21–32)
CREAT SERPL-MCNC: 1.06 MG/DL (ref 0.5–1.05)
EGFRCR SERPLBLD CKD-EPI 2021: 58 ML/MIN/1.73M*2
ERYTHROCYTE [DISTWIDTH] IN BLOOD BY AUTOMATED COUNT: 14.2 % (ref 11.5–14.5)
GLUCOSE SERPL-MCNC: 100 MG/DL (ref 74–99)
HCT VFR BLD AUTO: 39.6 % (ref 36–46)
HDLC SERPL-MCNC: 66.5 MG/DL
HGB BLD-MCNC: 12.9 G/DL (ref 12–16)
LDLC SERPL CALC-MCNC: 111 MG/DL
MCH RBC QN AUTO: 30.4 PG (ref 26–34)
MCHC RBC AUTO-ENTMCNC: 32.6 G/DL (ref 32–36)
MCV RBC AUTO: 93 FL (ref 80–100)
NON HDL CHOLESTEROL: 132 MG/DL (ref 0–149)
NRBC BLD-RTO: 0 /100 WBCS (ref 0–0)
PLATELET # BLD AUTO: 333 X10*3/UL (ref 150–450)
POTASSIUM SERPL-SCNC: 4.4 MMOL/L (ref 3.5–5.3)
PROT SERPL-MCNC: 7.2 G/DL (ref 6.4–8.2)
RBC # BLD AUTO: 4.25 X10*6/UL (ref 4–5.2)
SODIUM SERPL-SCNC: 138 MMOL/L (ref 136–145)
TRIGL SERPL-MCNC: 105 MG/DL (ref 0–149)
TSH SERPL-ACNC: 5.74 MIU/L (ref 0.44–3.98)
VLDL: 21 MG/DL (ref 0–40)
WBC # BLD AUTO: 7.3 X10*3/UL (ref 4.4–11.3)

## 2024-03-11 PROCEDURE — 36415 COLL VENOUS BLD VENIPUNCTURE: CPT

## 2024-03-11 PROCEDURE — 80053 COMPREHEN METABOLIC PANEL: CPT

## 2024-03-11 PROCEDURE — 80061 LIPID PANEL: CPT

## 2024-03-11 PROCEDURE — 84443 ASSAY THYROID STIM HORMONE: CPT

## 2024-03-11 PROCEDURE — 85027 COMPLETE CBC AUTOMATED: CPT

## 2024-03-11 RX ORDER — LEVOTHYROXINE SODIUM 75 UG/1
75 TABLET ORAL DAILY
Qty: 30 TABLET | Refills: 0 | Status: SHIPPED | OUTPATIENT
Start: 2024-03-11 | End: 2024-03-18 | Stop reason: SDUPTHER

## 2024-03-18 ENCOUNTER — OFFICE VISIT (OUTPATIENT)
Dept: PRIMARY CARE | Facility: CLINIC | Age: 69
End: 2024-03-18
Payer: MEDICARE

## 2024-03-18 VITALS
SYSTOLIC BLOOD PRESSURE: 128 MMHG | BODY MASS INDEX: 25.48 KG/M2 | DIASTOLIC BLOOD PRESSURE: 64 MMHG | HEIGHT: 70 IN | WEIGHT: 178 LBS

## 2024-03-18 DIAGNOSIS — I10 HYPERTENSION, UNSPECIFIED TYPE: ICD-10-CM

## 2024-03-18 DIAGNOSIS — R10.84 GENERALIZED ABDOMINAL PAIN: ICD-10-CM

## 2024-03-18 DIAGNOSIS — E03.9 HYPOTHYROIDISM, UNSPECIFIED TYPE: ICD-10-CM

## 2024-03-18 DIAGNOSIS — F32.A DEPRESSION, UNSPECIFIED DEPRESSION TYPE: ICD-10-CM

## 2024-03-18 DIAGNOSIS — F41.9 ANXIETY: ICD-10-CM

## 2024-03-18 DIAGNOSIS — E78.5 HYPERLIPIDEMIA, UNSPECIFIED HYPERLIPIDEMIA TYPE: ICD-10-CM

## 2024-03-18 PROCEDURE — 1159F MED LIST DOCD IN RCRD: CPT | Performed by: INTERNAL MEDICINE

## 2024-03-18 PROCEDURE — 3074F SYST BP LT 130 MM HG: CPT | Performed by: INTERNAL MEDICINE

## 2024-03-18 PROCEDURE — 3078F DIAST BP <80 MM HG: CPT | Performed by: INTERNAL MEDICINE

## 2024-03-18 PROCEDURE — 99214 OFFICE O/P EST MOD 30 MIN: CPT | Performed by: INTERNAL MEDICINE

## 2024-03-18 RX ORDER — ATORVASTATIN CALCIUM 20 MG/1
20 TABLET, FILM COATED ORAL DAILY
Qty: 90 TABLET | Refills: 0 | Status: SHIPPED | OUTPATIENT
Start: 2024-03-18 | End: 2024-06-10 | Stop reason: SDUPTHER

## 2024-03-18 RX ORDER — LEVOTHYROXINE SODIUM 88 UG/1
88 TABLET ORAL EVERY OTHER DAY
Qty: 45 TABLET | Refills: 0 | Status: SHIPPED | OUTPATIENT
Start: 2024-03-18 | End: 2024-06-10 | Stop reason: SDUPTHER

## 2024-03-18 RX ORDER — LEVOTHYROXINE SODIUM 75 UG/1
75 TABLET ORAL EVERY OTHER DAY
Qty: 45 TABLET | Refills: 0 | Status: SHIPPED | OUTPATIENT
Start: 2024-03-18 | End: 2025-03-18

## 2024-03-18 RX ORDER — DULOXETIN HYDROCHLORIDE 60 MG/1
60 CAPSULE, DELAYED RELEASE ORAL DAILY
Qty: 90 CAPSULE | Refills: 0 | Status: SHIPPED | OUTPATIENT
Start: 2024-03-18 | End: 2024-06-10 | Stop reason: SDUPTHER

## 2024-03-18 RX ORDER — OLMESARTAN MEDOXOMIL 20 MG/1
20 TABLET ORAL DAILY
Qty: 90 TABLET | Refills: 0 | Status: SHIPPED | OUTPATIENT
Start: 2024-03-18 | End: 2024-06-10 | Stop reason: SDUPTHER

## 2024-03-18 NOTE — PROGRESS NOTES
"Subjective   Patient ID: Rebecca Trinidad is a 68 y.o. female who presents for Follow-up.    Med Refill    Patient is here for follow-up on blood work  Follow-up on hypothyroidism, hypothyroidism, hypertension  Overall doing well but still getting abdominal pain in the right quadrant.  Last year she has taken the antibiotics felt better for a while but then started hurting off-and-on again    Patient is here for follow-up on hypothyroidism  With dose adjustment she is feeling little better here is not following as much but she still feels very tired  She needs refills on Valtrex she has history of genital herpes\"   Needs refill on Cymbalta helps with her anxiety and depression    patient is here for follow-up  Has multiple complaints  Complaining of hair falling  Complaining of feeling very fatigued  Complaining of right-sided abdominal pain  She does have problems with constipation      Past recap   patient started complaining of having right-sided abdominal pain since Wednesday it was very sharp lasted for few hours since then it is just mild but not going away.  She is constipated no diarrhea denies any nausea vomiting.  Denies any urinary frequency or burning.  Denies any fever  Concerned about appendicitis  Complaining of cyst on the back of the right wrist     Patient is here for follow-up on blood work  Needs medication refill  Follow-up on hypothyroidism hypertension high cholesterol  Patient is still taking Cymbalta and needs refill. She was using her daughter's prescription so did not prescription from us  Patient is also complaining of having abdominal cramping from time to time  She does have anxiety issues which makes cramping worse. Sometimes she gets diarrhea sometimes constipation. She does not eat much fiber in  She has history of colon polyps and overdue for colonoscopy     having pain in left shoulder , going on for a while   has fibromyalgia   no injury , pain in movement up and back   sometimes " "heavy lifting at working , lot of repititive work      She does not have insurance  Sometimes gets tingling in the lower lip even though she does not have a sore so she takes Valtrex and it helps  October 24 she went to urgent care because she was having dizziness told fluid in the ER treated with Z-Don her right ear was hurting still she is feeling dizzy she was off work for 1 week  She is still getting off balance and spinning sensation        Patient here to establish  Needs refills on medications  Follow-up on hypothyroidism hypertension high cholesterol  Patient does not have insurance does not want you out of work  She will have insurance next year     Past medical history: Fibromyalgia, colon polyp, osteoarthritis, Hashimoto's disease, hypothyroidism, hypertension, high cholesterol, right ankle surgery, right thumb surgery, carpal tunnel surgery, genital herpes  Social history: Smokes drinks moderate alcohol  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   Constitutional:       Appearance: Normal appearance.   HENT:      Head: Normocephalic and atraumatic.      Right Ear: Tympanic membrane, ear canal and external ear normal.      Left Ear: Tympanic membrane, ear canal and external ear normal.      Nose: Nose normal.      Mouth/Throat:      Pharynx: Oropharynx is clear.   Eyes:      Extraocular Movements: Extraocular movements intact.      Conjunctiva/sclera: Conjunctivae normal.      Pupils: Pupils are equal, round, and reactive to light.   Cardiovascular:      Rate and Rhythm: Normal rate and regular rhythm.      Pulses: Normal pulses.      Heart sounds: Normal heart sounds.   Pulmonary:      Effort: Pulmonary effort is normal.      Breath sounds: Normal breath sounds. "   Abdominal:      General: Abdomen is flat. Bowel sounds are normal.      Palpations: Abdomen is soft.      Tenderness: There is abdominal tenderness.      Comments: Tenderness right lower quadrant   Musculoskeletal:      Cervical back: Normal range of motion and neck supple.   Skin:     General: Skin is warm and dry.   Neurological:      General: No focal deficit present.      Mental Status: She is alert and oriented to person, place, and time.   Psychiatric:         Mood and Affect: Mood normal.         Assessment/Plan   Problem List Items Addressed This Visit             ICD-10-CM       Cardiac and Vasculature    Hyperlipidemia E78.5    Relevant Medications    atorvastatin (Lipitor) 20 mg tablet    Other Relevant Orders    CBC    Comprehensive Metabolic Panel    Lipid Panel    Thyroid Stimulating Hormone    Hypertension I10    Relevant Medications    olmesartan (BENIcar) 20 mg tablet    Other Relevant Orders    CBC    Comprehensive Metabolic Panel    Lipid Panel    Thyroid Stimulating Hormone       Endocrine/Metabolic    Hypothyroidism E03.9    Relevant Medications    Synthroid 75 mcg tablet    Synthroid 88 mcg tablet       Gastrointestinal and Abdominal    Abdominal pain R10.9    Relevant Orders    CT abdomen pelvis w IV contrast    Creatinine       Mental Health    Anxiety F41.9    Relevant Medications    DULoxetine (Cymbalta) 60 mg DR capsule    Other Relevant Orders    CBC    Comprehensive Metabolic Panel    Lipid Panel    Thyroid Stimulating Hormone    Depression F32.A    Relevant Medications    DULoxetine (Cymbalta) 60 mg DR capsule    Other Relevant Orders    CBC    Comprehensive Metabolic Panel    Lipid Panel    Thyroid Stimulating Hormone    4/11  Mammogram ordered  Blood work ordered  EKG done shows normal sinus rhythm  Colonoscopy ordered  Pneumonia vaccine ordered  Shingles vaccine ordered  Blood work ordered CBC CMP fasting with TSH  X-ray of the left shoulder ordered  Reviewed x-ray results no  osteoarthritis  Symptoms most likely from biceps tendinitis  Discussed stretching exercises  Medications refilled for 3 months  Follow-up after the testing     7/18  CT cardiac scoring showed score of more than 300  Patient is still smoking  Discussed risk factors for heart disease  Advised to quit smoking  Ultrasound kidneys normal  X-ray shoulder shows arthritis  Advised to continue shoulder therapy  Blood work is all in range  Thyroid in range  Blood pressure stable  Refills given on allergy medication     1/23/23   blood pressure stable  Blood work reviewed  Cholesterol well controlled  Colonoscopy results reviewed  No diverticular disease  Symptoms most likely IBS  Advised to increase fiber in the diet  Refer to screening colonoscopy  Refills given on Cymbalta  Follow-up blood work in 6 months      5/8/2023  Urine analysis done shows blood  Will get CT of the abdomen and pelvis stat  CAT scan reviewed no hematuria  Patient has panniculitis which can be causing the pain  With Cipro and Flagyl  Severe constipation  Discussed stool softeners and bowel regimen to help with the  6 cyst on the wrist is ganglion cyst benign        7/10/2023  Last CAT scan results reviewed  Patient had constipation  Advised patient to stay on Colace and MiraLAX every day and if that does not work we will give lactulose  Blood work reviewed  Cholesterol well controlled, CMP normal  TSH slightly suppressed at 0.12  We will keep same dose  Recheck at follow-up in 3 months    10/2/2023  Thyroid is in range  Medication refill  Cholesterol well controlled  Cymbalta refilled  Valtrex prescription given  Not sure cause for her fatigue patient says she has fibromyalgia  Encourage patient to do exercises encouraged to do swimming which will help her fatigue and fibromyalgia symptoms  Follow-up in 6 months    12/18/2023  Blood work reviewed  TSH still suppressed  Decrease levothyroxine 75 mcg  Cholesterol very well-controlled  Blood pressure  well-controlled  Follow-up blood work in 3 months    3/18/2024  Last CAT scan reviewed  Will check CT abdomen and pelvis with contrast to rule out any chronic appendicitis going on  TSH now suppressed quite a bit will treat with Synthroid 75 alternating with 88  Blood pressure is okay  Elevated creatinine discussed with low-salt diet and increase water intake  Follow-up blood work in 3 months  Follow-up after CAT scan

## 2024-03-25 ENCOUNTER — LAB (OUTPATIENT)
Dept: LAB | Facility: LAB | Age: 69
End: 2024-03-25
Payer: MEDICARE

## 2024-03-25 DIAGNOSIS — R10.84 GENERALIZED ABDOMINAL PAIN: ICD-10-CM

## 2024-03-25 LAB
CREAT SERPL-MCNC: 1.11 MG/DL (ref 0.5–1.05)
EGFRCR SERPLBLD CKD-EPI 2021: 55 ML/MIN/1.73M*2

## 2024-03-25 PROCEDURE — 82565 ASSAY OF CREATININE: CPT

## 2024-03-25 PROCEDURE — 36415 COLL VENOUS BLD VENIPUNCTURE: CPT

## 2024-03-26 ENCOUNTER — TELEPHONE (OUTPATIENT)
Dept: PRIMARY CARE | Facility: CLINIC | Age: 69
End: 2024-03-26
Payer: MEDICARE

## 2024-03-29 ENCOUNTER — OFFICE VISIT (OUTPATIENT)
Dept: PRIMARY CARE | Facility: CLINIC | Age: 69
End: 2024-03-29
Payer: MEDICARE

## 2024-03-29 VITALS
HEIGHT: 70 IN | DIASTOLIC BLOOD PRESSURE: 68 MMHG | BODY MASS INDEX: 24.48 KG/M2 | WEIGHT: 171 LBS | SYSTOLIC BLOOD PRESSURE: 122 MMHG

## 2024-03-29 DIAGNOSIS — R79.89 ELEVATED SERUM CREATININE: Primary | ICD-10-CM

## 2024-03-29 DIAGNOSIS — I10 PRIMARY HYPERTENSION: ICD-10-CM

## 2024-03-29 DIAGNOSIS — E78.2 MIXED HYPERLIPIDEMIA: ICD-10-CM

## 2024-03-29 PROCEDURE — 1159F MED LIST DOCD IN RCRD: CPT | Performed by: INTERNAL MEDICINE

## 2024-03-29 PROCEDURE — 3078F DIAST BP <80 MM HG: CPT | Performed by: INTERNAL MEDICINE

## 2024-03-29 PROCEDURE — 3074F SYST BP LT 130 MM HG: CPT | Performed by: INTERNAL MEDICINE

## 2024-03-29 PROCEDURE — 99213 OFFICE O/P EST LOW 20 MIN: CPT | Performed by: INTERNAL MEDICINE

## 2024-03-29 NOTE — PROGRESS NOTES
"Subjective   Patient ID: Rebecca Trinidad is a 68 y.o. female who presents for Follow-up.    Med Refill  Patient is here for follow-up on blood work    Patient is here for follow-up on blood work  Follow-up on hypothyroidism, hypothyroidism, hypertension  Overall doing well but still getting abdominal pain in the right quadrant.  Last year she has taken the antibiotics felt better for a while but then started hurting off-and-on again    Patient is here for follow-up on hypothyroidism  With dose adjustment she is feeling little better here is not following as much but she still feels very tired  She needs refills on Valtrex she has history of genital herpes\"   Needs refill on Cymbalta helps with her anxiety and depression    patient is here for follow-up  Has multiple complaints  Complaining of hair falling  Complaining of feeling very fatigued  Complaining of right-sided abdominal pain  She does have problems with constipation      Past recap   patient started complaining of having right-sided abdominal pain since Wednesday it was very sharp lasted for few hours since then it is just mild but not going away.  She is constipated no diarrhea denies any nausea vomiting.  Denies any urinary frequency or burning.  Denies any fever  Concerned about appendicitis  Complaining of cyst on the back of the right wrist     Patient is here for follow-up on blood work  Needs medication refill  Follow-up on hypothyroidism hypertension high cholesterol  Patient is still taking Cymbalta and needs refill. She was using her daughter's prescription so did not prescription from us  Patient is also complaining of having abdominal cramping from time to time  She does have anxiety issues which makes cramping worse. Sometimes she gets diarrhea sometimes constipation. She does not eat much fiber in  She has history of colon polyps and overdue for colonoscopy     having pain in left shoulder , going on for a while   has fibromyalgia   no " "injury , pain in movement up and back   sometimes heavy lifting at working , lot of repititive work      She does not have insurance  Sometimes gets tingling in the lower lip even though she does not have a sore so she takes Valtrex and it helps  October 24 she went to urgent care because she was having dizziness told fluid in the ER treated with Z-Don her right ear was hurting still she is feeling dizzy she was off work for 1 week  She is still getting off balance and spinning sensation        Patient here to establish  Needs refills on medications  Follow-up on hypothyroidism hypertension high cholesterol  Patient does not have insurance does not want you out of work  She will have insurance next year     Past medical history: Fibromyalgia, colon polyp, osteoarthritis, Hashimoto's disease, hypothyroidism, hypertension, high cholesterol, right ankle surgery, right thumb surgery, carpal tunnel surgery, genital herpes  Social history: Smokes drinks moderate alcohol  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   Constitutional:       Appearance: Normal appearance.   HENT:      Head: Normocephalic and atraumatic.      Right Ear: Tympanic membrane, ear canal and external ear normal.      Left Ear: Tympanic membrane, ear canal and external ear normal.      Nose: Nose normal.      Mouth/Throat:      Pharynx: Oropharynx is clear.   Eyes:      Extraocular Movements: Extraocular movements intact.      Conjunctiva/sclera: Conjunctivae normal.      Pupils: Pupils are equal, round, and reactive to light.   Cardiovascular:      Rate and Rhythm: Normal rate and regular rhythm.      Pulses: Normal pulses.      Heart sounds: Normal heart sounds.   Pulmonary:      Effort: Pulmonary effort is normal.     "  Breath sounds: Normal breath sounds.   Abdominal:      General: Abdomen is flat. Bowel sounds are normal.      Palpations: Abdomen is soft.      Tenderness: There is abdominal tenderness.      Comments: Tenderness right lower quadrant   Musculoskeletal:      Cervical back: Normal range of motion and neck supple.   Skin:     General: Skin is warm and dry.   Neurological:      General: No focal deficit present.      Mental Status: She is alert and oriented to person, place, and time.   Psychiatric:         Mood and Affect: Mood normal.       Assessment/Plan   Problem List Items Addressed This Visit    None   4/11  Mammogram ordered  Blood work ordered  EKG done shows normal sinus rhythm  Colonoscopy ordered  Pneumonia vaccine ordered  Shingles vaccine ordered  Blood work ordered CBC CMP fasting with TSH  X-ray of the left shoulder ordered  Reviewed x-ray results no osteoarthritis  Symptoms most likely from biceps tendinitis  Discussed stretching exercises  Medications refilled for 3 months  Follow-up after the testing     7/18  CT cardiac scoring showed score of more than 300  Patient is still smoking  Discussed risk factors for heart disease  Advised to quit smoking  Ultrasound kidneys normal  X-ray shoulder shows arthritis  Advised to continue shoulder therapy  Blood work is all in range  Thyroid in range  Blood pressure stable  Refills given on allergy medication     1/23/23   blood pressure stable  Blood work reviewed  Cholesterol well controlled  Colonoscopy results reviewed  No diverticular disease  Symptoms most likely IBS  Advised to increase fiber in the diet  Refer to screening colonoscopy  Refills given on Cymbalta  Follow-up blood work in 6 months      5/8/2023  Urine analysis done shows blood  Will get CT of the abdomen and pelvis stat  CAT scan reviewed no hematuria  Patient has panniculitis which can be causing the pain  With Cipro and Flagyl  Severe constipation  Discussed stool softeners and bowel  regimen to help with the  6 cyst on the wrist is ganglion cyst benign        7/10/2023  Last CAT scan results reviewed  Patient had constipation  Advised patient to stay on Colace and MiraLAX every day and if that does not work we will give lactulose  Blood work reviewed  Cholesterol well controlled, CMP normal  TSH slightly suppressed at 0.12  We will keep same dose  Recheck at follow-up in 3 months    10/2/2023  Thyroid is in range  Medication refill  Cholesterol well controlled  Cymbalta refilled  Valtrex prescription given  Not sure cause for her fatigue patient says she has fibromyalgia  Encourage patient to do exercises encouraged to do swimming which will help her fatigue and fibromyalgia symptoms  Follow-up in 6 months    12/18/2023  Blood work reviewed  TSH still suppressed  Decrease levothyroxine 75 mcg  Cholesterol very well-controlled  Blood pressure well-controlled  Follow-up blood work in 3 months    3/18/2024  Last CAT scan reviewed  Will check CT abdomen and pelvis with contrast to rule out any chronic appendicitis going on  TSH now suppressed quite a bit will treat with Synthroid 75 alternating with 88  Blood pressure is okay  Elevated creatinine discussed with low-salt diet and increase water intake  Follow-up blood work in 3 months  Follow-up after CAT scan    3/29/2024  Blood work reviewed  Creatinine is elevated  Check BMP on Monday after hydration

## 2024-04-01 ENCOUNTER — APPOINTMENT (OUTPATIENT)
Dept: PRIMARY CARE | Facility: CLINIC | Age: 69
End: 2024-04-01
Payer: MEDICARE

## 2024-04-01 ENCOUNTER — LAB (OUTPATIENT)
Dept: LAB | Facility: LAB | Age: 69
End: 2024-04-01
Payer: MEDICARE

## 2024-04-01 DIAGNOSIS — E78.2 MIXED HYPERLIPIDEMIA: ICD-10-CM

## 2024-04-01 DIAGNOSIS — I10 PRIMARY HYPERTENSION: ICD-10-CM

## 2024-04-01 LAB
ANION GAP SERPL CALC-SCNC: 13 MMOL/L (ref 10–20)
BUN SERPL-MCNC: 11 MG/DL (ref 6–23)
CALCIUM SERPL-MCNC: 10.1 MG/DL (ref 8.6–10.6)
CHLORIDE SERPL-SCNC: 96 MMOL/L (ref 98–107)
CO2 SERPL-SCNC: 27 MMOL/L (ref 21–32)
CREAT SERPL-MCNC: 1.04 MG/DL (ref 0.5–1.05)
EGFRCR SERPLBLD CKD-EPI 2021: 59 ML/MIN/1.73M*2
GLUCOSE SERPL-MCNC: 103 MG/DL (ref 74–99)
POTASSIUM SERPL-SCNC: 4.6 MMOL/L (ref 3.5–5.3)
SODIUM SERPL-SCNC: 131 MMOL/L (ref 136–145)

## 2024-04-01 PROCEDURE — 80048 BASIC METABOLIC PNL TOTAL CA: CPT

## 2024-04-01 PROCEDURE — 36415 COLL VENOUS BLD VENIPUNCTURE: CPT

## 2024-04-02 ENCOUNTER — HOSPITAL ENCOUNTER (OUTPATIENT)
Dept: RADIOLOGY | Facility: CLINIC | Age: 69
Discharge: HOME | End: 2024-04-02
Payer: MEDICARE

## 2024-04-02 DIAGNOSIS — R10.84 GENERALIZED ABDOMINAL PAIN: ICD-10-CM

## 2024-04-02 PROCEDURE — 74177 CT ABD & PELVIS W/CONTRAST: CPT | Performed by: RADIOLOGY

## 2024-04-02 PROCEDURE — 74177 CT ABD & PELVIS W/CONTRAST: CPT

## 2024-04-02 PROCEDURE — 2550000001 HC RX 255 CONTRASTS: Performed by: INTERNAL MEDICINE

## 2024-04-02 RX ADMIN — IOHEXOL 75 ML: 350 INJECTION, SOLUTION INTRAVENOUS at 11:05

## 2024-04-11 PROBLEM — R79.89 ELEVATED SERUM CREATININE: Status: ACTIVE | Noted: 2024-04-11

## 2024-04-15 ENCOUNTER — APPOINTMENT (OUTPATIENT)
Dept: RADIOLOGY | Facility: CLINIC | Age: 69
End: 2024-04-15
Payer: MEDICARE

## 2024-06-08 ENCOUNTER — LAB (OUTPATIENT)
Dept: LAB | Facility: LAB | Age: 69
End: 2024-06-08
Payer: MEDICARE

## 2024-06-08 DIAGNOSIS — F41.9 ANXIETY: ICD-10-CM

## 2024-06-08 DIAGNOSIS — F32.A DEPRESSION, UNSPECIFIED DEPRESSION TYPE: ICD-10-CM

## 2024-06-08 DIAGNOSIS — I10 HYPERTENSION, UNSPECIFIED TYPE: ICD-10-CM

## 2024-06-08 DIAGNOSIS — E78.5 HYPERLIPIDEMIA, UNSPECIFIED HYPERLIPIDEMIA TYPE: ICD-10-CM

## 2024-06-08 LAB
ALBUMIN SERPL BCP-MCNC: 4.4 G/DL (ref 3.4–5)
ALP SERPL-CCNC: 61 U/L (ref 33–136)
ALT SERPL W P-5'-P-CCNC: 28 U/L (ref 7–45)
ANION GAP SERPL CALC-SCNC: 11 MMOL/L (ref 10–20)
AST SERPL W P-5'-P-CCNC: 28 U/L (ref 9–39)
BILIRUB SERPL-MCNC: 0.4 MG/DL (ref 0–1.2)
BUN SERPL-MCNC: 21 MG/DL (ref 6–23)
CALCIUM SERPL-MCNC: 9.6 MG/DL (ref 8.6–10.6)
CHLORIDE SERPL-SCNC: 101 MMOL/L (ref 98–107)
CHOLEST SERPL-MCNC: 157 MG/DL (ref 0–199)
CHOLESTEROL/HDL RATIO: 3.2
CO2 SERPL-SCNC: 29 MMOL/L (ref 21–32)
CREAT SERPL-MCNC: 0.94 MG/DL (ref 0.5–1.05)
EGFRCR SERPLBLD CKD-EPI 2021: 67 ML/MIN/1.73M*2
ERYTHROCYTE [DISTWIDTH] IN BLOOD BY AUTOMATED COUNT: 13.9 % (ref 11.5–14.5)
GLUCOSE SERPL-MCNC: 101 MG/DL (ref 74–99)
HCT VFR BLD AUTO: 35.7 % (ref 36–46)
HDLC SERPL-MCNC: 49.7 MG/DL
HGB BLD-MCNC: 12.4 G/DL (ref 12–16)
LDLC SERPL CALC-MCNC: 89 MG/DL
MCH RBC QN AUTO: 31.6 PG (ref 26–34)
MCHC RBC AUTO-ENTMCNC: 34.7 G/DL (ref 32–36)
MCV RBC AUTO: 91 FL (ref 80–100)
NON HDL CHOLESTEROL: 107 MG/DL (ref 0–149)
NRBC BLD-RTO: 0 /100 WBCS (ref 0–0)
PLATELET # BLD AUTO: 293 X10*3/UL (ref 150–450)
POTASSIUM SERPL-SCNC: 4.8 MMOL/L (ref 3.5–5.3)
PROT SERPL-MCNC: 6.7 G/DL (ref 6.4–8.2)
RBC # BLD AUTO: 3.92 X10*6/UL (ref 4–5.2)
SODIUM SERPL-SCNC: 136 MMOL/L (ref 136–145)
TRIGL SERPL-MCNC: 90 MG/DL (ref 0–149)
TSH SERPL-ACNC: 9.67 MIU/L (ref 0.44–3.98)
VLDL: 18 MG/DL (ref 0–40)
WBC # BLD AUTO: 5.1 X10*3/UL (ref 4.4–11.3)

## 2024-06-08 PROCEDURE — 80053 COMPREHEN METABOLIC PANEL: CPT

## 2024-06-08 PROCEDURE — 36415 COLL VENOUS BLD VENIPUNCTURE: CPT

## 2024-06-08 PROCEDURE — 80061 LIPID PANEL: CPT

## 2024-06-08 PROCEDURE — 84443 ASSAY THYROID STIM HORMONE: CPT

## 2024-06-08 PROCEDURE — 85027 COMPLETE CBC AUTOMATED: CPT

## 2024-06-10 ENCOUNTER — OFFICE VISIT (OUTPATIENT)
Dept: PRIMARY CARE | Facility: CLINIC | Age: 69
End: 2024-06-10
Payer: MEDICARE

## 2024-06-10 VITALS
DIASTOLIC BLOOD PRESSURE: 60 MMHG | WEIGHT: 169 LBS | HEIGHT: 70 IN | BODY MASS INDEX: 24.2 KG/M2 | SYSTOLIC BLOOD PRESSURE: 118 MMHG

## 2024-06-10 DIAGNOSIS — F32.A DEPRESSION, UNSPECIFIED DEPRESSION TYPE: ICD-10-CM

## 2024-06-10 DIAGNOSIS — F41.9 ANXIETY: ICD-10-CM

## 2024-06-10 DIAGNOSIS — E78.5 HYPERLIPIDEMIA, UNSPECIFIED HYPERLIPIDEMIA TYPE: ICD-10-CM

## 2024-06-10 DIAGNOSIS — I10 HYPERTENSION, UNSPECIFIED TYPE: ICD-10-CM

## 2024-06-10 DIAGNOSIS — E03.9 HYPOTHYROIDISM, UNSPECIFIED TYPE: ICD-10-CM

## 2024-06-10 DIAGNOSIS — J30.2 SEASONAL ALLERGIC RHINITIS, UNSPECIFIED TRIGGER: ICD-10-CM

## 2024-06-10 DIAGNOSIS — M25.40 JOINT SWELLING: ICD-10-CM

## 2024-06-10 PROCEDURE — 99215 OFFICE O/P EST HI 40 MIN: CPT | Performed by: INTERNAL MEDICINE

## 2024-06-10 PROCEDURE — 1159F MED LIST DOCD IN RCRD: CPT | Performed by: INTERNAL MEDICINE

## 2024-06-10 PROCEDURE — 3078F DIAST BP <80 MM HG: CPT | Performed by: INTERNAL MEDICINE

## 2024-06-10 PROCEDURE — G0438 PPPS, INITIAL VISIT: HCPCS | Performed by: INTERNAL MEDICINE

## 2024-06-10 PROCEDURE — 1170F FXNL STATUS ASSESSED: CPT | Performed by: INTERNAL MEDICINE

## 2024-06-10 PROCEDURE — 3074F SYST BP LT 130 MM HG: CPT | Performed by: INTERNAL MEDICINE

## 2024-06-10 RX ORDER — OLMESARTAN MEDOXOMIL 20 MG/1
20 TABLET ORAL DAILY
Qty: 90 TABLET | Refills: 0 | Status: SHIPPED | OUTPATIENT
Start: 2024-06-10 | End: 2024-09-08

## 2024-06-10 RX ORDER — ATORVASTATIN CALCIUM 20 MG/1
20 TABLET, FILM COATED ORAL DAILY
Qty: 90 TABLET | Refills: 0 | Status: SHIPPED | OUTPATIENT
Start: 2024-06-10 | End: 2024-06-12

## 2024-06-10 RX ORDER — LEVOTHYROXINE SODIUM 75 UG/1
75 TABLET ORAL EVERY OTHER DAY
Qty: 45 TABLET | Refills: 0 | Status: CANCELLED | OUTPATIENT
Start: 2024-06-10 | End: 2025-06-10

## 2024-06-10 RX ORDER — FLUTICASONE PROPIONATE 50 MCG
2 SPRAY, SUSPENSION (ML) NASAL DAILY
Qty: 16 G | Refills: 0 | Status: SHIPPED | OUTPATIENT
Start: 2024-06-10 | End: 2024-09-08

## 2024-06-10 RX ORDER — DULOXETIN HYDROCHLORIDE 60 MG/1
60 CAPSULE, DELAYED RELEASE ORAL DAILY
Qty: 90 CAPSULE | Refills: 0 | Status: SHIPPED | OUTPATIENT
Start: 2024-06-10 | End: 2024-06-12

## 2024-06-10 RX ORDER — METHYLPREDNISOLONE 4 MG/1
TABLET ORAL
Qty: 21 TABLET | Refills: 0 | Status: SHIPPED | OUTPATIENT
Start: 2024-06-10 | End: 2024-06-17

## 2024-06-10 RX ORDER — LEVOTHYROXINE SODIUM 88 UG/1
88 TABLET ORAL DAILY
Qty: 90 TABLET | Refills: 0 | Status: SHIPPED | OUTPATIENT
Start: 2024-06-10 | End: 2025-06-10

## 2024-06-10 ASSESSMENT — PATIENT HEALTH QUESTIONNAIRE - PHQ9
10. IF YOU CHECKED OFF ANY PROBLEMS, HOW DIFFICULT HAVE THESE PROBLEMS MADE IT FOR YOU TO DO YOUR WORK, TAKE CARE OF THINGS AT HOME, OR GET ALONG WITH OTHER PEOPLE: SOMEWHAT DIFFICULT
SUM OF ALL RESPONSES TO PHQ9 QUESTIONS 1 AND 2: 2
1. LITTLE INTEREST OR PLEASURE IN DOING THINGS: SEVERAL DAYS
2. FEELING DOWN, DEPRESSED OR HOPELESS: SEVERAL DAYS

## 2024-06-10 ASSESSMENT — ACTIVITIES OF DAILY LIVING (ADL)
MANAGING_FINANCES: INDEPENDENT
TAKING_MEDICATION: INDEPENDENT
GROCERY_SHOPPING: INDEPENDENT
DOING_HOUSEWORK: INDEPENDENT
BATHING: INDEPENDENT
DRESSING: INDEPENDENT

## 2024-06-10 ASSESSMENT — ENCOUNTER SYMPTOMS
DEPRESSION: 0
OCCASIONAL FEELINGS OF UNSTEADINESS: 0
LOSS OF SENSATION IN FEET: 0

## 2024-06-10 NOTE — PROGRESS NOTES
"Subjective   Patient ID: Rebecca Trinidad is a 69 y.o. female who presents for Medicare Annual Wellness Visit Subsequent.    Med Refill    Patient is here for annual Medicare wellness exam patient is here for follow-up on blood work  Gets dizzy when standing up too long  Patient is here for follow-up on blood work  Follow-up on hypothyroidism, hypothyroidism, hypertension  Complaining of pain in the joints specially fingers hurt    Patient is here for follow-up on hypothyroidism  With dose adjustment she is feeling little better here is not following as much but she still feels very tired  She needs refills on Valtrex she has history of genital herpes\"   Needs refill on Cymbalta helps with her anxiety and depression    patient is here for follow-up  Has multiple complaints  Complaining of hair falling  Complaining of feeling very fatigued  Complaining of right-sided abdominal pain  She does have problems with constipation      Past recap   patient started complaining of having right-sided abdominal pain since Wednesday it was very sharp lasted for few hours since then it is just mild but not going away.  She is constipated no diarrhea denies any nausea vomiting.  Denies any urinary frequency or burning.  Denies any fever  Concerned about appendicitis  Complaining of cyst on the back of the right wrist     Patient is here for follow-up on blood work  Needs medication refill  Follow-up on hypothyroidism hypertension high cholesterol  Patient is still taking Cymbalta and needs refill. She was using her daughter's prescription so did not prescription from us  Patient is also complaining of having abdominal cramping from time to time  She does have anxiety issues which makes cramping worse. Sometimes she gets diarrhea sometimes constipation. She does not eat much fiber in  She has history of colon polyps and overdue for colonoscopy     having pain in left shoulder , going on for a while   has fibromyalgia   no injury , " "pain in movement up and back   sometimes heavy lifting at working , lot of repititive work      She does not have insurance  Sometimes gets tingling in the lower lip even though she does not have a sore so she takes Valtrex and it helps  October 24 she went to urgent care because she was having dizziness told fluid in the ER treated with Z-Don her right ear was hurting still she is feeling dizzy she was off work for 1 week  She is still getting off balance and spinning sensation        Patient here to establish  Needs refills on medications  Follow-up on hypothyroidism hypertension high cholesterol  Patient does not have insurance does not want you out of work  She will have insurance next year     Past medical history: Fibromyalgia, colon polyp, osteoarthritis, Hashimoto's disease, hypothyroidism, hypertension, high cholesterol, right ankle surgery, right thumb surgery, carpal tunnel surgery, genital herpes  Social history: Smokes drinks moderate alcohol  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   Constitutional:       Appearance: Normal appearance.   HENT:      Head: Normocephalic and atraumatic.      Right Ear: Tympanic membrane, ear canal and external ear normal.      Left Ear: Tympanic membrane, ear canal and external ear normal.      Nose: Nose normal.      Mouth/Throat:      Pharynx: Oropharynx is clear.   Eyes:      Extraocular Movements: Extraocular movements intact.      Conjunctiva/sclera: Conjunctivae normal.      Pupils: Pupils are equal, round, and reactive to light.   Cardiovascular:      Rate and Rhythm: Normal rate and regular rhythm.      Pulses: Normal pulses.      Heart sounds: Normal heart sounds.   Pulmonary:      Effort: Pulmonary effort is normal.      Breath " sounds: Normal breath sounds.   Abdominal:      General: Abdomen is flat. Bowel sounds are normal.      Palpations: Abdomen is soft.   Musculoskeletal:      Cervical back: Normal range of motion and neck supple.   Skin:     General: Skin is warm and dry.   Neurological:      General: No focal deficit present.      Mental Status: She is alert and oriented to person, place, and time.   Psychiatric:         Mood and Affect: Mood normal.         Assessment/Plan   Problem List Items Addressed This Visit             ICD-10-CM       Cardiac and Vasculature    Hyperlipidemia E78.5    Relevant Orders    CBC    Comprehensive Metabolic Panel    Lipid Panel    Thyroid Stimulating Hormone    Hypertension I10    Relevant Orders    CBC    Comprehensive Metabolic Panel    Lipid Panel    Thyroid Stimulating Hormone       ENT    Seasonal allergic rhinitis J30.2    Relevant Orders    CBC    Comprehensive Metabolic Panel    Lipid Panel    Thyroid Stimulating Hormone       Endocrine/Metabolic    Hypothyroidism E03.9    Relevant Orders    CBC    Comprehensive Metabolic Panel    Lipid Panel    Thyroid Stimulating Hormone       Mental Health    Anxiety F41.9    Relevant Orders    CBC    Comprehensive Metabolic Panel    Lipid Panel    Thyroid Stimulating Hormone    Depression F32.A    Relevant Orders    CBC    Comprehensive Metabolic Panel    Lipid Panel    Thyroid Stimulating Hormone    4/11  Mammogram ordered  Blood work ordered  EKG done shows normal sinus rhythm  Colonoscopy ordered  Pneumonia vaccine ordered  Shingles vaccine ordered  Blood work ordered CBC CMP fasting with TSH  X-ray of the left shoulder ordered  Reviewed x-ray results no osteoarthritis  Symptoms most likely from biceps tendinitis  Discussed stretching exercises  Medications refilled for 3 months  Follow-up after the testing     7/18  CT cardiac scoring showed score of more than 300  Patient is still smoking  Discussed risk factors for heart disease  Advised to quit  smoking  Ultrasound kidneys normal  X-ray shoulder shows arthritis  Advised to continue shoulder therapy  Blood work is all in range  Thyroid in range  Blood pressure stable  Refills given on allergy medication     1/23/23   blood pressure stable  Blood work reviewed  Cholesterol well controlled  Colonoscopy results reviewed  No diverticular disease  Symptoms most likely IBS  Advised to increase fiber in the diet  Refer to screening colonoscopy  Refills given on Cymbalta  Follow-up blood work in 6 months      5/8/2023  Urine analysis done shows blood  Will get CT of the abdomen and pelvis stat  CAT scan reviewed no hematuria  Patient has panniculitis which can be causing the pain  With Cipro and Flagyl  Severe constipation  Discussed stool softeners and bowel regimen to help with the  6 cyst on the wrist is ganglion cyst benign        7/10/2023  Last CAT scan results reviewed  Patient had constipation  Advised patient to stay on Colace and MiraLAX every day and if that does not work we will give lactulose  Blood work reviewed  Cholesterol well controlled, CMP normal  TSH slightly suppressed at 0.12  We will keep same dose  Recheck at follow-up in 3 months    10/2/2023  Thyroid is in range  Medication refill  Cholesterol well controlled  Cymbalta refilled  Valtrex prescription given  Not sure cause for her fatigue patient says she has fibromyalgia  Encourage patient to do exercises encouraged to do swimming which will help her fatigue and fibromyalgia symptoms  Follow-up in 6 months    12/18/2023  Blood work reviewed  TSH still suppressed  Decrease levothyroxine 75 mcg  Cholesterol very well-controlled  Blood pressure well-controlled  Follow-up blood work in 3 months    3/18/2024  Last CAT scan reviewed  Will check CT abdomen and pelvis with contrast to rule out any chronic appendicitis going on  TSH now suppressed quite a bit will treat with Synthroid 75 alternating with 88  Blood pressure is okay  Elevated  creatinine discussed with low-salt diet and increase water intake  Follow-up blood work in 3 months  Follow-up after CAT scan    3/29/2024  Blood work reviewed  Creatinine is elevated  Check BMP on Monday after hydration    6/10/2024  Medicare wellness exam done  Patient screened positive for depression  Had extensive talk  Patient still refusing depression medication  Sister has dementia patient had 2 out of 3 recall serial sevens she could only do 1  She does not have power of  does not have a living will  She is still smoking which increases her risk but patient is now willing to make any changes at this point  Blood work reviewed  Increase Synthroid 88 mcg as TSH 9.67  Cholesterol well-controlled  Medrol Dosepak given for the joint pains  Add uric acid to the lab to rule out gout

## 2024-08-26 ENCOUNTER — LAB (OUTPATIENT)
Dept: LAB | Facility: LAB | Age: 69
End: 2024-08-26
Payer: MEDICARE

## 2024-08-26 DIAGNOSIS — M25.40 JOINT SWELLING: ICD-10-CM

## 2024-08-26 DIAGNOSIS — E03.9 HYPOTHYROIDISM, UNSPECIFIED TYPE: ICD-10-CM

## 2024-08-26 DIAGNOSIS — J30.2 SEASONAL ALLERGIC RHINITIS, UNSPECIFIED TRIGGER: ICD-10-CM

## 2024-08-26 DIAGNOSIS — F32.A DEPRESSION, UNSPECIFIED DEPRESSION TYPE: ICD-10-CM

## 2024-08-26 DIAGNOSIS — I10 HYPERTENSION, UNSPECIFIED TYPE: ICD-10-CM

## 2024-08-26 DIAGNOSIS — F41.9 ANXIETY: ICD-10-CM

## 2024-08-26 DIAGNOSIS — E78.5 HYPERLIPIDEMIA, UNSPECIFIED HYPERLIPIDEMIA TYPE: ICD-10-CM

## 2024-08-26 LAB
ALBUMIN SERPL BCP-MCNC: 4.2 G/DL (ref 3.4–5)
ALP SERPL-CCNC: 64 U/L (ref 33–136)
ALT SERPL W P-5'-P-CCNC: 29 U/L (ref 7–45)
ANION GAP SERPL CALC-SCNC: 13 MMOL/L (ref 10–20)
AST SERPL W P-5'-P-CCNC: 26 U/L (ref 9–39)
BILIRUB SERPL-MCNC: 0.4 MG/DL (ref 0–1.2)
BUN SERPL-MCNC: 18 MG/DL (ref 6–23)
CALCIUM SERPL-MCNC: 9.8 MG/DL (ref 8.6–10.6)
CHLORIDE SERPL-SCNC: 99 MMOL/L (ref 98–107)
CHOLEST SERPL-MCNC: 142 MG/DL (ref 0–199)
CHOLESTEROL/HDL RATIO: 3.1
CO2 SERPL-SCNC: 28 MMOL/L (ref 21–32)
CREAT SERPL-MCNC: 1 MG/DL (ref 0.5–1.05)
EGFRCR SERPLBLD CKD-EPI 2021: 62 ML/MIN/1.73M*2
ERYTHROCYTE [DISTWIDTH] IN BLOOD BY AUTOMATED COUNT: 13.1 % (ref 11.5–14.5)
GLUCOSE SERPL-MCNC: 105 MG/DL (ref 74–99)
HCT VFR BLD AUTO: 35.7 % (ref 36–46)
HDLC SERPL-MCNC: 45.4 MG/DL
HGB BLD-MCNC: 12.1 G/DL (ref 12–16)
LDLC SERPL CALC-MCNC: 74 MG/DL
MCH RBC QN AUTO: 31.1 PG (ref 26–34)
MCHC RBC AUTO-ENTMCNC: 33.9 G/DL (ref 32–36)
MCV RBC AUTO: 92 FL (ref 80–100)
NON HDL CHOLESTEROL: 97 MG/DL (ref 0–149)
NRBC BLD-RTO: 0 /100 WBCS (ref 0–0)
PLATELET # BLD AUTO: 276 X10*3/UL (ref 150–450)
POTASSIUM SERPL-SCNC: 4.9 MMOL/L (ref 3.5–5.3)
PROT SERPL-MCNC: 6.5 G/DL (ref 6.4–8.2)
RBC # BLD AUTO: 3.89 X10*6/UL (ref 4–5.2)
SODIUM SERPL-SCNC: 135 MMOL/L (ref 136–145)
TRIGL SERPL-MCNC: 114 MG/DL (ref 0–149)
TSH SERPL-ACNC: 0.65 MIU/L (ref 0.44–3.98)
URATE SERPL-MCNC: 5.5 MG/DL (ref 2.3–6.7)
VLDL: 23 MG/DL (ref 0–40)
WBC # BLD AUTO: 4.5 X10*3/UL (ref 4.4–11.3)

## 2024-08-26 PROCEDURE — 84443 ASSAY THYROID STIM HORMONE: CPT

## 2024-08-26 PROCEDURE — 85027 COMPLETE CBC AUTOMATED: CPT

## 2024-08-26 PROCEDURE — 80061 LIPID PANEL: CPT

## 2024-08-26 PROCEDURE — 80053 COMPREHEN METABOLIC PANEL: CPT

## 2024-08-26 PROCEDURE — 36415 COLL VENOUS BLD VENIPUNCTURE: CPT

## 2024-08-26 PROCEDURE — 84550 ASSAY OF BLOOD/URIC ACID: CPT

## 2024-09-09 ENCOUNTER — APPOINTMENT (OUTPATIENT)
Dept: PRIMARY CARE | Facility: CLINIC | Age: 69
End: 2024-09-09
Payer: MEDICARE

## 2024-09-09 VITALS
BODY MASS INDEX: 24.48 KG/M2 | DIASTOLIC BLOOD PRESSURE: 68 MMHG | WEIGHT: 171 LBS | SYSTOLIC BLOOD PRESSURE: 128 MMHG | HEIGHT: 70 IN

## 2024-09-09 DIAGNOSIS — F41.9 ANXIETY: ICD-10-CM

## 2024-09-09 DIAGNOSIS — T78.40XA ALLERGY, INITIAL ENCOUNTER: ICD-10-CM

## 2024-09-09 DIAGNOSIS — E78.5 HYPERLIPIDEMIA, UNSPECIFIED HYPERLIPIDEMIA TYPE: ICD-10-CM

## 2024-09-09 DIAGNOSIS — F32.A DEPRESSION, UNSPECIFIED DEPRESSION TYPE: ICD-10-CM

## 2024-09-09 DIAGNOSIS — E03.9 HYPOTHYROIDISM, UNSPECIFIED TYPE: ICD-10-CM

## 2024-09-09 PROCEDURE — 1159F MED LIST DOCD IN RCRD: CPT | Performed by: INTERNAL MEDICINE

## 2024-09-09 PROCEDURE — 3078F DIAST BP <80 MM HG: CPT | Performed by: INTERNAL MEDICINE

## 2024-09-09 PROCEDURE — 99213 OFFICE O/P EST LOW 20 MIN: CPT | Performed by: INTERNAL MEDICINE

## 2024-09-09 PROCEDURE — 3074F SYST BP LT 130 MM HG: CPT | Performed by: INTERNAL MEDICINE

## 2024-09-09 PROCEDURE — 3008F BODY MASS INDEX DOCD: CPT | Performed by: INTERNAL MEDICINE

## 2024-09-09 RX ORDER — CETIRIZINE HYDROCHLORIDE 10 MG/1
10 TABLET ORAL DAILY
Qty: 90 TABLET | Refills: 0 | Status: SHIPPED | OUTPATIENT
Start: 2024-09-09

## 2024-09-09 RX ORDER — DULOXETIN HYDROCHLORIDE 60 MG/1
60 CAPSULE, DELAYED RELEASE ORAL DAILY
Qty: 90 CAPSULE | Refills: 0 | Status: SHIPPED | OUTPATIENT
Start: 2024-09-09

## 2024-09-09 RX ORDER — LEVOTHYROXINE SODIUM 100 UG/1
100 TABLET ORAL DAILY
Qty: 90 TABLET | Refills: 0 | Status: SHIPPED | OUTPATIENT
Start: 2024-09-09 | End: 2025-09-09

## 2024-09-09 RX ORDER — ATORVASTATIN CALCIUM 20 MG/1
20 TABLET, FILM COATED ORAL DAILY
Qty: 90 TABLET | Refills: 0 | Status: SHIPPED | OUTPATIENT
Start: 2024-09-09

## 2024-09-10 DIAGNOSIS — Z12.31 ENCOUNTER FOR SCREENING MAMMOGRAM FOR BREAST CANCER: ICD-10-CM

## 2024-09-18 NOTE — PROGRESS NOTES
"Subjective   Patient ID: Rebecca Trinidad is a 69 y.o. female who presents for Follow-up.    Med Refill    Patient is here for follow-up on blood work  Follow-up on hypothyroidism, hypertension, hyper cholesterolemia    Past recap   patient is here for annual Medicare wellness exam patient is here for follow-up on blood work  Gets dizzy when standing up too long  Patient is here for follow-up on blood work  Follow-up on hypothyroidism, hypothyroidism, hypertension  Complaining of pain in the joints specially fingers hurt    Patient is here for follow-up on hypothyroidism  With dose adjustment she is feeling little better here is not following as much but she still feels very tired  She needs refills on Valtrex she has history of genital herpes\"   Needs refill on Cymbalta helps with her anxiety and depression    patient is here for follow-up  Has multiple complaints  Complaining of hair falling  Complaining of feeling very fatigued  Complaining of right-sided abdominal pain  She does have problems with constipation      Past recap   patient started complaining of having right-sided abdominal pain since Wednesday it was very sharp lasted for few hours since then it is just mild but not going away.  She is constipated no diarrhea denies any nausea vomiting.  Denies any urinary frequency or burning.  Denies any fever  Concerned about appendicitis  Complaining of cyst on the back of the right wrist     Patient is here for follow-up on blood work  Needs medication refill  Follow-up on hypothyroidism hypertension high cholesterol  Patient is still taking Cymbalta and needs refill. She was using her daughter's prescription so did not prescription from us  Patient is also complaining of having abdominal cramping from time to time  She does have anxiety issues which makes cramping worse. Sometimes she gets diarrhea sometimes constipation. She does not eat much fiber in  She has history of colon polyps and overdue for " "colonoscopy     having pain in left shoulder , going on for a while   has fibromyalgia   no injury , pain in movement up and back   sometimes heavy lifting at working , lot of repititive work      She does not have insurance  Sometimes gets tingling in the lower lip even though she does not have a sore so she takes Valtrex and it helps  October 24 she went to urgent care because she was having dizziness told fluid in the ER treated with Z-Don her right ear was hurting still she is feeling dizzy she was off work for 1 week  She is still getting off balance and spinning sensation        Patient here to establish  Needs refills on medications  Follow-up on hypothyroidism hypertension high cholesterol  Patient does not have insurance does not want you out of work  She will have insurance next year     Past medical history: Fibromyalgia, colon polyp, osteoarthritis, Hashimoto's disease, hypothyroidism, hypertension, high cholesterol, right ankle surgery, right thumb surgery, carpal tunnel surgery, genital herpes  Social history: Smokes drinks moderate alcohol  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   Constitutional:       Appearance: Normal appearance.   HENT:      Head: Normocephalic and atraumatic.      Right Ear: Tympanic membrane, ear canal and external ear normal.      Left Ear: Tympanic membrane, ear canal and external ear normal.      Nose: Nose normal.      Mouth/Throat:      Pharynx: Oropharynx is clear.   Eyes:      Extraocular Movements: Extraocular movements intact.      Conjunctiva/sclera: Conjunctivae normal.      Pupils: Pupils are equal, round, and reactive to light.   Cardiovascular:      Rate and Rhythm: Normal rate and regular rhythm.      Pulses: Normal pulses.      " Heart sounds: Normal heart sounds.   Pulmonary:      Effort: Pulmonary effort is normal.      Breath sounds: Normal breath sounds.   Abdominal:      General: Abdomen is flat. Bowel sounds are normal.      Palpations: Abdomen is soft.   Musculoskeletal:      Cervical back: Normal range of motion and neck supple.   Skin:     General: Skin is warm and dry.   Neurological:      General: No focal deficit present.      Mental Status: She is alert and oriented to person, place, and time.   Psychiatric:         Mood and Affect: Mood normal.         Assessment/Plan   Problem List Items Addressed This Visit             ICD-10-CM       Cardiac and Vasculature    Hyperlipidemia E78.5    Relevant Medications    atorvastatin (Lipitor) 20 mg tablet       Endocrine/Metabolic    Hypothyroidism E03.9    Relevant Medications    Synthroid 100 mcg tablet    Other Relevant Orders    Thyroid Stimulating Hormone    Thyroxine, Free       Mental Health    Anxiety F41.9    Relevant Medications    DULoxetine (Cymbalta) 60 mg DR capsule    Depression F32.A    Relevant Medications    DULoxetine (Cymbalta) 60 mg DR capsule     Other Visit Diagnoses         Codes    Allergy, initial encounter     T78.40XA    Relevant Medications    cetirizine (ZyrTEC) 10 mg tablet         4/11  Mammogram ordered  Blood work ordered  EKG done shows normal sinus rhythm  Colonoscopy ordered  Pneumonia vaccine ordered  Shingles vaccine ordered  Blood work ordered CBC CMP fasting with TSH  X-ray of the left shoulder ordered  Reviewed x-ray results no osteoarthritis  Symptoms most likely from biceps tendinitis  Discussed stretching exercises  Medications refilled for 3 months  Follow-up after the testing     7/18  CT cardiac scoring showed score of more than 300  Patient is still smoking  Discussed risk factors for heart disease  Advised to quit smoking  Ultrasound kidneys normal  X-ray shoulder shows arthritis  Advised to continue shoulder therapy  Blood work is all in  range  Thyroid in range  Blood pressure stable  Refills given on allergy medication     1/23/23   blood pressure stable  Blood work reviewed  Cholesterol well controlled  Colonoscopy results reviewed  No diverticular disease  Symptoms most likely IBS  Advised to increase fiber in the diet  Refer to screening colonoscopy  Refills given on Cymbalta  Follow-up blood work in 6 months      5/8/2023  Urine analysis done shows blood  Will get CT of the abdomen and pelvis stat  CAT scan reviewed no hematuria  Patient has panniculitis which can be causing the pain  With Cipro and Flagyl  Severe constipation  Discussed stool softeners and bowel regimen to help with the  6 cyst on the wrist is ganglion cyst benign        7/10/2023  Last CAT scan results reviewed  Patient had constipation  Advised patient to stay on Colace and MiraLAX every day and if that does not work we will give lactulose  Blood work reviewed  Cholesterol well controlled, CMP normal  TSH slightly suppressed at 0.12  We will keep same dose  Recheck at follow-up in 3 months    10/2/2023  Thyroid is in range  Medication refill  Cholesterol well controlled  Cymbalta refilled  Valtrex prescription given  Not sure cause for her fatigue patient says she has fibromyalgia  Encourage patient to do exercises encouraged to do swimming which will help her fatigue and fibromyalgia symptoms  Follow-up in 6 months    12/18/2023  Blood work reviewed  TSH still suppressed  Decrease levothyroxine 75 mcg  Cholesterol very well-controlled  Blood pressure well-controlled  Follow-up blood work in 3 months    3/18/2024  Last CAT scan reviewed  Will check CT abdomen and pelvis with contrast to rule out any chronic appendicitis going on  TSH now suppressed quite a bit will treat with Synthroid 75 alternating with 88  Blood pressure is okay  Elevated creatinine discussed with low-salt diet and increase water intake  Follow-up blood work in 3 months  Follow-up after CAT  scan    3/29/2024  Blood work reviewed  Creatinine is elevated  Check BMP on Monday after hydration    6/10/2024  Medicare wellness exam done  Patient screened positive for depression  Had extensive talk  Patient still refusing depression medication  Sister has dementia patient had 2 out of 3 recall serial sevens she could only do 1  She does not have power of  does not have a living will  She is still smoking which increases her risk but patient is now willing to make any changes at this point  Blood work reviewed  Increase Synthroid 88 mcg as TSH 9.67  Cholesterol well-controlled  Medrol Dosepak given for the joint pains  Add uric acid to the lab to rule out gout    9/9/2024  Appreciated for quitting smoking in June blood work reviewed  TSH is in normal range  Cholesterol very well-controlled  Sodium is slightly low at 135 blood sugar slightly elevated at 105 otherwise all normal  Continue Synthroid 100 mcg  Recheck blood work 3 months

## 2024-10-15 DIAGNOSIS — B00.1 HERPES LABIALIS: ICD-10-CM

## 2024-10-15 DIAGNOSIS — A60.00 GENITAL HERPES SIMPLEX, UNSPECIFIED SITE: ICD-10-CM

## 2024-10-15 RX ORDER — VALACYCLOVIR HYDROCHLORIDE 500 MG/1
TABLET, FILM COATED ORAL
Qty: 6 TABLET | Refills: 0 | Status: SHIPPED | OUTPATIENT
Start: 2024-10-15

## 2024-10-17 ENCOUNTER — OFFICE VISIT (OUTPATIENT)
Dept: URGENT CARE | Age: 69
End: 2024-10-17
Payer: MEDICARE

## 2024-10-17 VITALS
OXYGEN SATURATION: 98 % | WEIGHT: 165 LBS | SYSTOLIC BLOOD PRESSURE: 120 MMHG | HEART RATE: 72 BPM | DIASTOLIC BLOOD PRESSURE: 71 MMHG | RESPIRATION RATE: 16 BRPM | TEMPERATURE: 97.6 F | BODY MASS INDEX: 23.62 KG/M2 | HEIGHT: 70 IN

## 2024-10-17 DIAGNOSIS — M54.32 LEFT SCIATIC NERVE PAIN: Primary | ICD-10-CM

## 2024-10-17 RX ORDER — PREDNISONE 20 MG/1
40 TABLET ORAL DAILY
Qty: 10 TABLET | Refills: 0 | Status: SHIPPED | OUTPATIENT
Start: 2024-10-17 | End: 2024-10-22

## 2024-10-17 RX ORDER — DOXYCYCLINE 100 MG/1
100 CAPSULE ORAL 2 TIMES DAILY
Qty: 14 CAPSULE | Refills: 0 | Status: SHIPPED | OUTPATIENT
Start: 2024-10-17 | End: 2024-10-17 | Stop reason: ENTERED-IN-ERROR

## 2024-10-17 NOTE — PATIENT INSTRUCTIONS
For follow up, please call for appointment     [facility]      John Clements, BRIANNA Gale, DO       [address] [address]   [place] [address] [phone]

## 2024-10-17 NOTE — PROGRESS NOTES
Chief Complaint   Patient presents with    sciatic pain     Left side sciatic pain for 1 month, worse the past week       Physical Exam:                Encounter Diagnosis   Name Primary?    Left sciatic nerve pain Yes        Medical Decision Making & Plan:     Prednisone  Tylenol     F/U Sports Med prn   Enriqueta Restrepo DO

## 2024-11-10 DIAGNOSIS — I10 HYPERTENSION, UNSPECIFIED TYPE: ICD-10-CM

## 2024-11-11 RX ORDER — OLMESARTAN MEDOXOMIL 20 MG/1
20 TABLET ORAL DAILY
Qty: 90 TABLET | Refills: 0 | Status: SHIPPED | OUTPATIENT
Start: 2024-11-11

## 2024-12-02 DIAGNOSIS — E03.9 HYPOTHYROIDISM, UNSPECIFIED TYPE: ICD-10-CM

## 2024-12-03 RX ORDER — LEVOTHYROXINE SODIUM 100 UG/1
100 TABLET ORAL DAILY
Qty: 30 TABLET | Refills: 0 | Status: SHIPPED | OUTPATIENT
Start: 2024-12-03 | End: 2025-01-02

## 2024-12-07 ENCOUNTER — LAB (OUTPATIENT)
Dept: LAB | Facility: LAB | Age: 69
End: 2024-12-07
Payer: MEDICARE

## 2024-12-07 DIAGNOSIS — E03.9 HYPOTHYROIDISM, UNSPECIFIED TYPE: ICD-10-CM

## 2024-12-07 LAB
T4 FREE SERPL-MCNC: 1.55 NG/DL (ref 0.78–1.48)
TSH SERPL-ACNC: 0.27 MIU/L (ref 0.44–3.98)

## 2024-12-07 PROCEDURE — 84443 ASSAY THYROID STIM HORMONE: CPT | Performed by: INTERNAL MEDICINE

## 2024-12-07 PROCEDURE — 84439 ASSAY OF FREE THYROXINE: CPT | Performed by: INTERNAL MEDICINE

## 2024-12-09 ENCOUNTER — APPOINTMENT (OUTPATIENT)
Dept: PRIMARY CARE | Facility: CLINIC | Age: 69
End: 2024-12-09
Payer: MEDICARE

## 2024-12-11 ENCOUNTER — APPOINTMENT (OUTPATIENT)
Dept: PRIMARY CARE | Facility: CLINIC | Age: 69
End: 2024-12-11
Payer: MEDICARE

## 2024-12-11 VITALS
HEIGHT: 70 IN | DIASTOLIC BLOOD PRESSURE: 68 MMHG | WEIGHT: 175 LBS | SYSTOLIC BLOOD PRESSURE: 118 MMHG | BODY MASS INDEX: 25.05 KG/M2

## 2024-12-11 DIAGNOSIS — F41.9 ANXIETY: Primary | ICD-10-CM

## 2024-12-11 DIAGNOSIS — E78.5 HYPERLIPIDEMIA, UNSPECIFIED HYPERLIPIDEMIA TYPE: ICD-10-CM

## 2024-12-11 DIAGNOSIS — I10 HYPERTENSION, UNSPECIFIED TYPE: ICD-10-CM

## 2024-12-11 DIAGNOSIS — E04.9 GOITER: ICD-10-CM

## 2024-12-11 DIAGNOSIS — E03.9 HYPOTHYROIDISM, UNSPECIFIED TYPE: ICD-10-CM

## 2024-12-11 PROCEDURE — 99214 OFFICE O/P EST MOD 30 MIN: CPT | Performed by: INTERNAL MEDICINE

## 2024-12-11 PROCEDURE — 1123F ACP DISCUSS/DSCN MKR DOCD: CPT | Performed by: INTERNAL MEDICINE

## 2024-12-11 PROCEDURE — 3008F BODY MASS INDEX DOCD: CPT | Performed by: INTERNAL MEDICINE

## 2024-12-11 PROCEDURE — 3078F DIAST BP <80 MM HG: CPT | Performed by: INTERNAL MEDICINE

## 2024-12-11 PROCEDURE — 1159F MED LIST DOCD IN RCRD: CPT | Performed by: INTERNAL MEDICINE

## 2024-12-11 PROCEDURE — 3074F SYST BP LT 130 MM HG: CPT | Performed by: INTERNAL MEDICINE

## 2024-12-11 NOTE — PROGRESS NOTES
"Subjective   Patient ID: Rebecca Trinidad is a 69 y.o. female who presents for Follow-up and Med Refill.    Med Refill    Patient is here for follow-up on blood work  Follow-up on hypothyroidism, hypertension, hyper cholesterolemia   losing a lot of hair.  Feels very tired fatigued yesterday fell and hurt the right knee scraped the knee but able to walk      Past recap   patient is here for annual Medicare wellness exam patient is here for follow-up on blood work  Gets dizzy when standing up too long  Patient is here for follow-up on blood work  Follow-up on hypothyroidism, hypothyroidism, hypertension  Complaining of pain in the joints specially fingers hurt    Patient is here for follow-up on hypothyroidism  With dose adjustment she is feeling little better here is not following as much but she still feels very tired  She needs refills on Valtrex she has history of genital herpes\"   Needs refill on Cymbalta helps with her anxiety and depression    patient is here for follow-up  Has multiple complaints  Complaining of hair falling  Complaining of feeling very fatigued  Complaining of right-sided abdominal pain  She does have problems with constipation      Past recap   patient started complaining of having right-sided abdominal pain since Wednesday it was very sharp lasted for few hours since then it is just mild but not going away.  She is constipated no diarrhea denies any nausea vomiting.  Denies any urinary frequency or burning.  Denies any fever  Concerned about appendicitis  Complaining of cyst on the back of the right wrist     Patient is here for follow-up on blood work  Needs medication refill  Follow-up on hypothyroidism hypertension high cholesterol  Patient is still taking Cymbalta and needs refill. She was using her daughter's prescription so did not prescription from us  Patient is also complaining of having abdominal cramping from time to time  She does have anxiety issues which makes cramping worse. " "Sometimes she gets diarrhea sometimes constipation. She does not eat much fiber in  She has history of colon polyps and overdue for colonoscopy     having pain in left shoulder , going on for a while   has fibromyalgia   no injury , pain in movement up and back   sometimes heavy lifting at working , lot of repititive work      She does not have insurance  Sometimes gets tingling in the lower lip even though she does not have a sore so she takes Valtrex and it helps  October 24 she went to urgent care because she was having dizziness told fluid in the ER treated with Z-Odn her right ear was hurting still she is feeling dizzy she was off work for 1 week  She is still getting off balance and spinning sensation        Patient here to establish  Needs refills on medications  Follow-up on hypothyroidism hypertension high cholesterol  Patient does not have insurance does not want you out of work  She will have insurance next year     Past medical history: Fibromyalgia, colon polyp, osteoarthritis, Hashimoto's disease, hypothyroidism, hypertension, high cholesterol, right ankle surgery, right thumb surgery, carpal tunnel surgery, genital herpes  Social history: Smokes drinks moderate alcohol  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   Constitutional:       Appearance: Normal appearance.   HENT:      Head: Normocephalic and atraumatic.      Right Ear: Tympanic membrane, ear canal and external ear normal.      Left Ear: Tympanic membrane, ear canal and external ear normal.      Nose: Nose normal.      Mouth/Throat:      Pharynx: Oropharynx is clear.   Eyes:      Extraocular Movements: Extraocular movements intact.      Conjunctiva/sclera: Conjunctivae normal.      Pupils: " Pupils are equal, round, and reactive to light.   Cardiovascular:      Rate and Rhythm: Normal rate and regular rhythm.      Pulses: Normal pulses.      Heart sounds: Normal heart sounds.   Pulmonary:      Effort: Pulmonary effort is normal.      Breath sounds: Normal breath sounds.   Abdominal:      General: Abdomen is flat. Bowel sounds are normal.      Palpations: Abdomen is soft.   Musculoskeletal:      Cervical back: Normal range of motion and neck supple.   Skin:     General: Skin is warm and dry.   Neurological:      General: No focal deficit present.      Mental Status: She is alert and oriented to person, place, and time.   Psychiatric:         Mood and Affect: Mood normal.         Assessment/Plan   Problem List Items Addressed This Visit             ICD-10-CM       Cardiac and Vasculature    Hyperlipidemia E78.5    Relevant Orders    CBC    Comprehensive Metabolic Panel    Lipid Panel    Thyroid Stimulating Hormone    Hypertension I10    Relevant Orders    CBC    Comprehensive Metabolic Panel    Lipid Panel    Thyroid Stimulating Hormone       Endocrine/Metabolic    Hypothyroidism E03.9    Relevant Orders    Referral to Endocrinology       Mental Health    Anxiety - Primary F41.9     Other Visit Diagnoses         Codes    Goiter     E04.9    Relevant Orders    US thyroid (Completed)           4/11  Mammogram ordered  Blood work ordered  EKG done shows normal sinus rhythm  Colonoscopy ordered  Pneumonia vaccine ordered  Shingles vaccine ordered  Blood work ordered CBC CMP fasting with TSH  X-ray of the left shoulder ordered  Reviewed x-ray results no osteoarthritis  Symptoms most likely from biceps tendinitis  Discussed stretching exercises  Medications refilled for 3 months  Follow-up after the testing     7/18  CT cardiac scoring showed score of more than 300  Patient is still smoking  Discussed risk factors for heart disease  Advised to quit smoking  Ultrasound kidneys normal  X-ray shoulder shows  arthritis  Advised to continue shoulder therapy  Blood work is all in range  Thyroid in range  Blood pressure stable  Refills given on allergy medication     1/23/23   blood pressure stable  Blood work reviewed  Cholesterol well controlled  Colonoscopy results reviewed  No diverticular disease  Symptoms most likely IBS  Advised to increase fiber in the diet  Refer to screening colonoscopy  Refills given on Cymbalta  Follow-up blood work in 6 months      5/8/2023  Urine analysis done shows blood  Will get CT of the abdomen and pelvis stat  CAT scan reviewed no hematuria  Patient has panniculitis which can be causing the pain  With Cipro and Flagyl  Severe constipation  Discussed stool softeners and bowel regimen to help with the  6 cyst on the wrist is ganglion cyst benign        7/10/2023  Last CAT scan results reviewed  Patient had constipation  Advised patient to stay on Colace and MiraLAX every day and if that does not work we will give lactulose  Blood work reviewed  Cholesterol well controlled, CMP normal  TSH slightly suppressed at 0.12  We will keep same dose  Recheck at follow-up in 3 months    10/2/2023  Thyroid is in range  Medication refill  Cholesterol well controlled  Cymbalta refilled  Valtrex prescription given  Not sure cause for her fatigue patient says she has fibromyalgia  Encourage patient to do exercises encouraged to do swimming which will help her fatigue and fibromyalgia symptoms  Follow-up in 6 months    12/18/2023  Blood work reviewed  TSH still suppressed  Decrease levothyroxine 75 mcg  Cholesterol very well-controlled  Blood pressure well-controlled  Follow-up blood work in 3 months    3/18/2024  Last CAT scan reviewed  Will check CT abdomen and pelvis with contrast to rule out any chronic appendicitis going on  TSH now suppressed quite a bit will treat with Synthroid 75 alternating with 88  Blood pressure is okay  Elevated creatinine discussed with low-salt diet and increase water  intake  Follow-up blood work in 3 months  Follow-up after CAT scan    3/29/2024  Blood work reviewed  Creatinine is elevated  Check BMP on Monday after hydration    6/10/2024  Medicare wellness exam done  Patient screened positive for depression  Had extensive talk  Patient still refusing depression medication  Sister has dementia patient had 2 out of 3 recall serial sevens she could only do 1  She does not have power of  does not have a living will  She is still smoking which increases her risk but patient is now willing to make any changes at this point  Blood work reviewed  Increase Synthroid 88 mcg as TSH 9.67  Cholesterol well-controlled  Medrol Dosepak given for the joint pains  Add uric acid to the lab to rule out gout    9/9/2024  Appreciated for quitting smoking in June blood work reviewed  TSH is in normal range  Cholesterol very well-controlled  Sodium is slightly low at 135 blood sugar slightly elevated at 105 otherwise all normal  Continue Synthroid 100 mcg  Recheck blood work 3 months    12/11/2024  Now TSH is more suppressed  On Will refer to endocrinologist  Check ultrasound thyroid  Blood work reviewed cholesterol well-controlled  Sodium slightly low at 134  Cholesterol well-controlled  Anxiety and depression doing fine on Cymbalta  Blood pressure is stable  Follow-up blood work in 3 months Purvi whenever you have a minute chest has

## 2024-12-16 ENCOUNTER — APPOINTMENT (OUTPATIENT)
Dept: RADIOLOGY | Facility: CLINIC | Age: 69
End: 2024-12-16
Payer: MEDICARE

## 2024-12-31 DIAGNOSIS — F32.A DEPRESSION, UNSPECIFIED DEPRESSION TYPE: ICD-10-CM

## 2024-12-31 DIAGNOSIS — F41.9 ANXIETY: ICD-10-CM

## 2024-12-31 DIAGNOSIS — E78.5 HYPERLIPIDEMIA, UNSPECIFIED HYPERLIPIDEMIA TYPE: ICD-10-CM

## 2024-12-31 DIAGNOSIS — E03.9 HYPOTHYROIDISM, UNSPECIFIED TYPE: ICD-10-CM

## 2025-01-01 RX ORDER — ATORVASTATIN CALCIUM 20 MG/1
20 TABLET, FILM COATED ORAL DAILY
Qty: 90 TABLET | Refills: 0 | Status: SHIPPED | OUTPATIENT
Start: 2025-01-01

## 2025-01-01 RX ORDER — LEVOTHYROXINE SODIUM 100 UG/1
TABLET ORAL
Qty: 30 TABLET | Refills: 0 | Status: SHIPPED | OUTPATIENT
Start: 2025-01-01

## 2025-01-01 RX ORDER — DULOXETIN HYDROCHLORIDE 60 MG/1
60 CAPSULE, DELAYED RELEASE ORAL DAILY
Qty: 90 CAPSULE | Refills: 0 | Status: SHIPPED | OUTPATIENT
Start: 2025-01-01

## 2025-01-02 ENCOUNTER — HOSPITAL ENCOUNTER (OUTPATIENT)
Dept: RADIOLOGY | Facility: CLINIC | Age: 70
Discharge: HOME | End: 2025-01-02
Payer: MEDICARE

## 2025-01-02 DIAGNOSIS — Z12.31 ENCOUNTER FOR SCREENING MAMMOGRAM FOR BREAST CANCER: ICD-10-CM

## 2025-01-02 PROCEDURE — 77067 SCR MAMMO BI INCL CAD: CPT

## 2025-01-07 ENCOUNTER — HOSPITAL ENCOUNTER (OUTPATIENT)
Dept: RADIOLOGY | Facility: CLINIC | Age: 70
Discharge: HOME | End: 2025-01-07
Payer: MEDICARE

## 2025-01-07 DIAGNOSIS — E04.9 GOITER: ICD-10-CM

## 2025-01-07 PROCEDURE — 76536 US EXAM OF HEAD AND NECK: CPT | Performed by: STUDENT IN AN ORGANIZED HEALTH CARE EDUCATION/TRAINING PROGRAM

## 2025-01-07 PROCEDURE — 76536 US EXAM OF HEAD AND NECK: CPT

## 2025-02-01 DIAGNOSIS — B00.1 HERPES LABIALIS: ICD-10-CM

## 2025-02-01 DIAGNOSIS — E03.9 HYPOTHYROIDISM, UNSPECIFIED TYPE: ICD-10-CM

## 2025-02-01 DIAGNOSIS — A60.00 GENITAL HERPES SIMPLEX, UNSPECIFIED SITE: ICD-10-CM

## 2025-02-01 RX ORDER — VALACYCLOVIR HYDROCHLORIDE 500 MG/1
TABLET, FILM COATED ORAL
Qty: 6 TABLET | Refills: 0 | Status: SHIPPED | OUTPATIENT
Start: 2025-02-01

## 2025-02-01 RX ORDER — LEVOTHYROXINE SODIUM 100 UG/1
TABLET ORAL
Qty: 30 TABLET | Refills: 0 | Status: SHIPPED | OUTPATIENT
Start: 2025-02-01

## 2025-02-05 DIAGNOSIS — I10 HYPERTENSION, UNSPECIFIED TYPE: ICD-10-CM

## 2025-02-08 RX ORDER — OLMESARTAN MEDOXOMIL 20 MG/1
20 TABLET ORAL DAILY
Qty: 90 TABLET | Refills: 0 | Status: SHIPPED | OUTPATIENT
Start: 2025-02-08

## 2025-03-03 ENCOUNTER — APPOINTMENT (OUTPATIENT)
Dept: PRIMARY CARE | Facility: CLINIC | Age: 70
End: 2025-03-03
Payer: MEDICARE

## 2025-03-03 VITALS
SYSTOLIC BLOOD PRESSURE: 124 MMHG | WEIGHT: 181 LBS | BODY MASS INDEX: 25.91 KG/M2 | DIASTOLIC BLOOD PRESSURE: 68 MMHG | HEIGHT: 70 IN

## 2025-03-03 DIAGNOSIS — K21.9 GASTROESOPHAGEAL REFLUX DISEASE WITHOUT ESOPHAGITIS: ICD-10-CM

## 2025-03-03 DIAGNOSIS — E78.5 HYPERLIPIDEMIA, UNSPECIFIED HYPERLIPIDEMIA TYPE: ICD-10-CM

## 2025-03-03 DIAGNOSIS — F32.A DEPRESSION, UNSPECIFIED DEPRESSION TYPE: ICD-10-CM

## 2025-03-03 DIAGNOSIS — I10 HYPERTENSION, UNSPECIFIED TYPE: ICD-10-CM

## 2025-03-03 DIAGNOSIS — E03.9 HYPOTHYROIDISM, UNSPECIFIED TYPE: ICD-10-CM

## 2025-03-03 DIAGNOSIS — R06.02 SOB (SHORTNESS OF BREATH): ICD-10-CM

## 2025-03-03 DIAGNOSIS — F41.9 ANXIETY: ICD-10-CM

## 2025-03-03 PROCEDURE — 3078F DIAST BP <80 MM HG: CPT | Performed by: INTERNAL MEDICINE

## 2025-03-03 PROCEDURE — 3074F SYST BP LT 130 MM HG: CPT | Performed by: INTERNAL MEDICINE

## 2025-03-03 PROCEDURE — 3008F BODY MASS INDEX DOCD: CPT | Performed by: INTERNAL MEDICINE

## 2025-03-03 PROCEDURE — 1159F MED LIST DOCD IN RCRD: CPT | Performed by: INTERNAL MEDICINE

## 2025-03-03 PROCEDURE — 1123F ACP DISCUSS/DSCN MKR DOCD: CPT | Performed by: INTERNAL MEDICINE

## 2025-03-03 PROCEDURE — 99214 OFFICE O/P EST MOD 30 MIN: CPT | Performed by: INTERNAL MEDICINE

## 2025-03-03 RX ORDER — AMINOPHYLLINE 25 MG/ML
125 INJECTION, SOLUTION INTRAVENOUS ONCE AS NEEDED
OUTPATIENT
Start: 2025-03-03

## 2025-03-03 RX ORDER — ATORVASTATIN CALCIUM 20 MG/1
20 TABLET, FILM COATED ORAL DAILY
Qty: 90 TABLET | Refills: 1 | Status: SHIPPED | OUTPATIENT
Start: 2025-03-03

## 2025-03-03 RX ORDER — LEVOTHYROXINE SODIUM 100 UG/1
100 TABLET ORAL DAILY
Qty: 90 TABLET | Refills: 1 | Status: SHIPPED | OUTPATIENT
Start: 2025-03-03

## 2025-03-03 RX ORDER — OLMESARTAN MEDOXOMIL 20 MG/1
20 TABLET ORAL DAILY
Qty: 90 TABLET | Refills: 1 | Status: SHIPPED | OUTPATIENT
Start: 2025-03-03

## 2025-03-03 RX ORDER — PANTOPRAZOLE SODIUM 40 MG/1
40 TABLET, DELAYED RELEASE ORAL DAILY
Qty: 30 TABLET | Refills: 1 | Status: SHIPPED | OUTPATIENT
Start: 2025-03-03 | End: 2025-05-02

## 2025-03-03 RX ORDER — REGADENOSON 0.08 MG/ML
0.4 INJECTION, SOLUTION INTRAVENOUS
Status: CANCELLED | OUTPATIENT
Start: 2025-03-03

## 2025-03-03 RX ORDER — DULOXETIN HYDROCHLORIDE 60 MG/1
60 CAPSULE, DELAYED RELEASE ORAL DAILY
Qty: 90 CAPSULE | Refills: 1 | Status: SHIPPED | OUTPATIENT
Start: 2025-03-03

## 2025-03-03 NOTE — PROGRESS NOTES
"Subjective   Patient ID: Rebecca Trinidad is a 69 y.o. female who presents for Follow-up and Med Refill.    Med Refill  Patient is here for follow-up  Complaining of having a lot of acid reflux  Quit smoking in June  Very few drinking short of breath on exertion    Past recap  Patient is here for follow-up on blood work  Follow-up on hypothyroidism, hypertension, hyper cholesterolemia   losing a lot of hair.  Feels very tired fatigued yesterday fell and hurt the right knee scraped the knee but able to walk      Past recap   patient is here for annual Medicare wellness exam patient is here for follow-up on blood work  Gets dizzy when standing up too long  Patient is here for follow-up on blood work  Follow-up on hypothyroidism, hypothyroidism, hypertension  Complaining of pain in the joints specially fingers hurt    Patient is here for follow-up on hypothyroidism  With dose adjustment she is feeling little better here is not following as much but she still feels very tired  She needs refills on Valtrex she has history of genital herpes\"   Needs refill on Cymbalta helps with her anxiety and depression    patient is here for follow-up  Has multiple complaints  Complaining of hair falling  Complaining of feeling very fatigued  Complaining of right-sided abdominal pain  She does have problems with constipation      Past recap   patient started complaining of having right-sided abdominal pain since Wednesday it was very sharp lasted for few hours since then it is just mild but not going away.  She is constipated no diarrhea denies any nausea vomiting.  Denies any urinary frequency or burning.  Denies any fever  Concerned about appendicitis  Complaining of cyst on the back of the right wrist     Patient is here for follow-up on blood work  Needs medication refill  Follow-up on hypothyroidism hypertension high cholesterol  Patient is still taking Cymbalta and needs refill. She was using her daughter's prescription so did " "not prescription from us  Patient is also complaining of having abdominal cramping from time to time  She does have anxiety issues which makes cramping worse. Sometimes she gets diarrhea sometimes constipation. She does not eat much fiber in  She has history of colon polyps and overdue for colonoscopy     having pain in left shoulder , going on for a while   has fibromyalgia   no injury , pain in movement up and back   sometimes heavy lifting at working , lot of repititive work      She does not have insurance  Sometimes gets tingling in the lower lip even though she does not have a sore so she takes Valtrex and it helps  October 24 she went to urgent care because she was having dizziness told fluid in the ER treated with Z-Don her right ear was hurting still she is feeling dizzy she was off work for 1 week  She is still getting off balance and spinning sensation        Patient here to establish  Needs refills on medications  Follow-up on hypothyroidism hypertension high cholesterol  Patient does not have insurance does not want you out of work  She will have insurance next year     Past medical history: Fibromyalgia, colon polyp, osteoarthritis, Hashimoto's disease, hypothyroidism, hypertension, high cholesterol, right ankle surgery, right thumb surgery, carpal tunnel surgery, genital herpes  Social history: Smokes drinks moderate alcohol  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   Constitutional:       Appearance: Normal appearance.   HENT:      Head: Normocephalic and atraumatic.      Right Ear: Tympanic membrane, ear canal and external ear normal.      Left Ear: Tympanic membrane, ear canal and external ear normal.      Nose: Nose normal.      Mouth/Throat: "      Pharynx: Oropharynx is clear.   Eyes:      Extraocular Movements: Extraocular movements intact.      Conjunctiva/sclera: Conjunctivae normal.      Pupils: Pupils are equal, round, and reactive to light.   Cardiovascular:      Rate and Rhythm: Normal rate and regular rhythm.      Pulses: Normal pulses.      Heart sounds: Normal heart sounds.   Pulmonary:      Effort: Pulmonary effort is normal.      Breath sounds: Normal breath sounds.   Abdominal:      General: Abdomen is flat. Bowel sounds are normal.      Palpations: Abdomen is soft.   Musculoskeletal:      Cervical back: Normal range of motion and neck supple.   Skin:     General: Skin is warm and dry.   Neurological:      General: No focal deficit present.      Mental Status: She is alert and oriented to person, place, and time.   Psychiatric:         Mood and Affect: Mood normal.       Assessment/Plan   Problem List Items Addressed This Visit             ICD-10-CM       Cardiac and Vasculature    Hyperlipidemia E78.5    Hypertension I10       Endocrine/Metabolic    Hypothyroidism E03.9       Mental Health    Anxiety F41.9    Depression F32.A      4/11  Mammogram ordered  Blood work ordered  EKG done shows normal sinus rhythm  Colonoscopy ordered  Pneumonia vaccine ordered  Shingles vaccine ordered  Blood work ordered CBC CMP fasting with TSH  X-ray of the left shoulder ordered  Reviewed x-ray results no osteoarthritis  Symptoms most likely from biceps tendinitis  Discussed stretching exercises  Medications refilled for 3 months  Follow-up after the testing     7/18  CT cardiac scoring showed score of more than 300  Patient is still smoking  Discussed risk factors for heart disease  Advised to quit smoking  Ultrasound kidneys normal  X-ray shoulder shows arthritis  Advised to continue shoulder therapy  Blood work is all in range  Thyroid in range  Blood pressure stable  Refills given on allergy medication     1/23/23   blood pressure stable  Blood work  reviewed  Cholesterol well controlled  Colonoscopy results reviewed  No diverticular disease  Symptoms most likely IBS  Advised to increase fiber in the diet  Refer to screening colonoscopy  Refills given on Cymbalta  Follow-up blood work in 6 months      5/8/2023  Urine analysis done shows blood  Will get CT of the abdomen and pelvis stat  CAT scan reviewed no hematuria  Patient has panniculitis which can be causing the pain  With Cipro and Flagyl  Severe constipation  Discussed stool softeners and bowel regimen to help with the  6 cyst on the wrist is ganglion cyst benign        7/10/2023  Last CAT scan results reviewed  Patient had constipation  Advised patient to stay on Colace and MiraLAX every day and if that does not work we will give lactulose  Blood work reviewed  Cholesterol well controlled, CMP normal  TSH slightly suppressed at 0.12  We will keep same dose  Recheck at follow-up in 3 months    10/2/2023  Thyroid is in range  Medication refill  Cholesterol well controlled  Cymbalta refilled  Valtrex prescription given  Not sure cause for her fatigue patient says she has fibromyalgia  Encourage patient to do exercises encouraged to do swimming which will help her fatigue and fibromyalgia symptoms  Follow-up in 6 months    12/18/2023  Blood work reviewed  TSH still suppressed  Decrease levothyroxine 75 mcg  Cholesterol very well-controlled  Blood pressure well-controlled  Follow-up blood work in 3 months    3/18/2024  Last CAT scan reviewed  Will check CT abdomen and pelvis with contrast to rule out any chronic appendicitis going on  TSH now suppressed quite a bit will treat with Synthroid 75 alternating with 88  Blood pressure is okay  Elevated creatinine discussed with low-salt diet and increase water intake  Follow-up blood work in 3 months  Follow-up after CAT scan    3/29/2024  Blood work reviewed  Creatinine is elevated  Check BMP on Monday after hydration    6/10/2024  Medicare wellness exam  done  Patient screened positive for depression  Had extensive talk  Patient still refusing depression medication  Sister has dementia patient had 2 out of 3 recall serial sevens she could only do 1  She does not have power of  does not have a living will  She is still smoking which increases her risk but patient is now willing to make any changes at this point  Blood work reviewed  Increase Synthroid 88 mcg as TSH 9.67  Cholesterol well-controlled  Medrol Dosepak given for the joint pains  Add uric acid to the lab to rule out gout    9/9/2024  Appreciated for quitting smoking in June blood work reviewed  TSH is in normal range  Cholesterol very well-controlled  Sodium is slightly low at 135 blood sugar slightly elevated at 105 otherwise all normal  Continue Synthroid 100 mcg  Recheck blood work 3 months    12/11/2024  Now TSH is more suppressed  On Will refer to endocrinologist  Check ultrasound thyroid  Blood work reviewed cholesterol well-controlled  Sodium slightly low at 134  Cholesterol well-controlled  Anxiety and depression doing fine on Cymbalta  Blood pressure is stable  Follow-up blood work in 3 months    3/3/2025  Ultrasound thyroid unremarkable  Patient's TSH has been fluctuating a lot  Refer to endocrinology  Protonix to help with GERD  Refer to GI  Symptoms of dyspnea on exertion will do 2D echo  Nuclear pharmacological stress test  Blood pressure stable  Blood work ordered  Follow up After blood work in the testing

## 2025-03-17 ENCOUNTER — HOSPITAL ENCOUNTER (OUTPATIENT)
Dept: RADIOLOGY | Facility: HOSPITAL | Age: 70
Discharge: HOME | End: 2025-03-17
Payer: MEDICARE

## 2025-03-17 ENCOUNTER — HOSPITAL ENCOUNTER (OUTPATIENT)
Dept: CARDIOLOGY | Facility: HOSPITAL | Age: 70
Discharge: HOME | End: 2025-03-17
Payer: MEDICARE

## 2025-03-17 DIAGNOSIS — R06.02 SOB (SHORTNESS OF BREATH): ICD-10-CM

## 2025-03-17 PROCEDURE — A9502 TC99M TETROFOSMIN: HCPCS | Performed by: INTERNAL MEDICINE

## 2025-03-17 PROCEDURE — 93018 CV STRESS TEST I&R ONLY: CPT | Performed by: INTERNAL MEDICINE

## 2025-03-17 PROCEDURE — 2500000004 HC RX 250 GENERAL PHARMACY W/ HCPCS (ALT 636 FOR OP/ED): Performed by: INTERNAL MEDICINE

## 2025-03-17 PROCEDURE — 93017 CV STRESS TEST TRACING ONLY: CPT

## 2025-03-17 PROCEDURE — 93016 CV STRESS TEST SUPVJ ONLY: CPT | Performed by: INTERNAL MEDICINE

## 2025-03-17 PROCEDURE — 3430000001 HC RX 343 DIAGNOSTIC RADIOPHARMACEUTICALS: Performed by: INTERNAL MEDICINE

## 2025-03-17 PROCEDURE — 78452 HT MUSCLE IMAGE SPECT MULT: CPT

## 2025-03-17 PROCEDURE — 78452 HT MUSCLE IMAGE SPECT MULT: CPT | Performed by: NUCLEAR MEDICINE

## 2025-03-17 RX ORDER — REGADENOSON 0.08 MG/ML
0.4 INJECTION, SOLUTION INTRAVENOUS
Status: COMPLETED | OUTPATIENT
Start: 2025-03-17 | End: 2025-03-17

## 2025-03-17 RX ADMIN — REGADENOSON 0.4 MG: 0.08 INJECTION, SOLUTION INTRAVENOUS at 09:06

## 2025-03-17 RX ADMIN — TETROFOSMIN 32.8 MILLICURIE: 0.23 INJECTION, POWDER, LYOPHILIZED, FOR SOLUTION INTRAVENOUS at 09:07

## 2025-03-17 RX ADMIN — TETROFOSMIN 10.4 MILLICURIE: 0.23 INJECTION, POWDER, LYOPHILIZED, FOR SOLUTION INTRAVENOUS at 07:34

## 2025-03-24 ENCOUNTER — APPOINTMENT (OUTPATIENT)
Dept: CARDIOLOGY | Facility: CLINIC | Age: 70
End: 2025-03-24
Payer: MEDICARE

## 2025-04-07 ENCOUNTER — HOSPITAL ENCOUNTER (OUTPATIENT)
Dept: CARDIOLOGY | Facility: CLINIC | Age: 70
Discharge: HOME | End: 2025-04-07
Payer: MEDICARE

## 2025-04-07 DIAGNOSIS — R06.02 SOB (SHORTNESS OF BREATH): ICD-10-CM

## 2025-04-07 PROCEDURE — 93306 TTE W/DOPPLER COMPLETE: CPT

## 2025-04-07 PROCEDURE — 93306 TTE W/DOPPLER COMPLETE: CPT | Performed by: INTERNAL MEDICINE

## 2025-04-08 LAB
AORTIC VALVE MEAN GRADIENT: 5 MMHG
AORTIC VALVE PEAK VELOCITY: 1.42 M/S
AV PEAK GRADIENT: 8 MMHG
AVA (PEAK VEL): 2.29 CM2
AVA (VTI): 2.11 CM2
EJECTION FRACTION APICAL 4 CHAMBER: 71.7
EJECTION FRACTION: 63 %
LEFT ATRIUM VOLUME AREA LENGTH INDEX BSA: 27.1 ML/M2
LEFT VENTRICLE INTERNAL DIMENSION DIASTOLE: 4.35 CM (ref 3.5–6)
LEFT VENTRICULAR OUTFLOW TRACT DIAMETER: 2 CM
MITRAL VALVE E/A RATIO: 0.95
RIGHT VENTRICLE FREE WALL PEAK S': 10 CM/S
RIGHT VENTRICLE PEAK SYSTOLIC PRESSURE: 30.2 MMHG
TRICUSPID ANNULAR PLANE SYSTOLIC EXCURSION: 2.5 CM

## 2025-04-21 LAB
ALBUMIN SERPL-MCNC: 4.8 G/DL (ref 3.6–5.1)
ALP SERPL-CCNC: 68 U/L (ref 37–153)
ALT SERPL-CCNC: 27 U/L (ref 6–29)
ANION GAP SERPL CALCULATED.4IONS-SCNC: 9 MMOL/L (CALC) (ref 7–17)
AST SERPL-CCNC: 23 U/L (ref 10–35)
BILIRUB SERPL-MCNC: 0.6 MG/DL (ref 0.2–1.2)
BUN SERPL-MCNC: 17 MG/DL (ref 7–25)
CALCIUM SERPL-MCNC: 10.1 MG/DL (ref 8.6–10.4)
CHLORIDE SERPL-SCNC: 100 MMOL/L (ref 98–110)
CHOLEST SERPL-MCNC: 177 MG/DL
CHOLEST/HDLC SERPL: 3 (CALC)
CO2 SERPL-SCNC: 26 MMOL/L (ref 20–32)
CREAT SERPL-MCNC: 0.91 MG/DL (ref 0.5–1.05)
EGFRCR SERPLBLD CKD-EPI 2021: 69 ML/MIN/1.73M2
ERYTHROCYTE [DISTWIDTH] IN BLOOD BY AUTOMATED COUNT: 12.9 % (ref 11–15)
GLUCOSE SERPL-MCNC: 105 MG/DL (ref 65–99)
HCT VFR BLD AUTO: 37.9 % (ref 35–45)
HDLC SERPL-MCNC: 60 MG/DL
HGB BLD-MCNC: 12.9 G/DL (ref 11.7–15.5)
LDLC SERPL CALC-MCNC: 96 MG/DL (CALC)
MCH RBC QN AUTO: 31.2 PG (ref 27–33)
MCHC RBC AUTO-ENTMCNC: 34 G/DL (ref 32–36)
MCV RBC AUTO: 91.5 FL (ref 80–100)
NONHDLC SERPL-MCNC: 117 MG/DL (CALC)
PLATELET # BLD AUTO: 291 THOUSAND/UL (ref 140–400)
PMV BLD REES-ECKER: 9.5 FL (ref 7.5–12.5)
POTASSIUM SERPL-SCNC: 4.6 MMOL/L (ref 3.5–5.3)
PROT SERPL-MCNC: 7.3 G/DL (ref 6.1–8.1)
RBC # BLD AUTO: 4.14 MILLION/UL (ref 3.8–5.1)
SODIUM SERPL-SCNC: 135 MMOL/L (ref 135–146)
TRIGL SERPL-MCNC: 116 MG/DL
TSH SERPL-ACNC: 0.72 MIU/L (ref 0.4–4.5)
WBC # BLD AUTO: 4.5 THOUSAND/UL (ref 3.8–10.8)

## 2025-04-23 ENCOUNTER — APPOINTMENT (OUTPATIENT)
Dept: ENDOCRINOLOGY | Facility: CLINIC | Age: 70
End: 2025-04-23
Payer: MEDICARE

## 2025-05-05 ENCOUNTER — OFFICE VISIT (OUTPATIENT)
Dept: GASTROENTEROLOGY | Facility: CLINIC | Age: 70
End: 2025-05-05
Payer: MEDICARE

## 2025-05-05 VITALS
HEIGHT: 70 IN | BODY MASS INDEX: 24.91 KG/M2 | SYSTOLIC BLOOD PRESSURE: 142 MMHG | TEMPERATURE: 98 F | DIASTOLIC BLOOD PRESSURE: 62 MMHG | HEART RATE: 73 BPM | WEIGHT: 174 LBS

## 2025-05-05 DIAGNOSIS — Z12.11 ENCOUNTER FOR SCREENING COLONOSCOPY: Primary | ICD-10-CM

## 2025-05-05 DIAGNOSIS — D12.6 SESSILE SERRATED POLYP OF COLON: ICD-10-CM

## 2025-05-05 DIAGNOSIS — K21.9 GASTROESOPHAGEAL REFLUX DISEASE WITHOUT ESOPHAGITIS: ICD-10-CM

## 2025-05-05 DIAGNOSIS — K58.1 IRRITABLE BOWEL SYNDROME WITH CONSTIPATION: ICD-10-CM

## 2025-05-05 PROCEDURE — 1160F RVW MEDS BY RX/DR IN RCRD: CPT | Performed by: NURSE PRACTITIONER

## 2025-05-05 PROCEDURE — 1126F AMNT PAIN NOTED NONE PRSNT: CPT | Performed by: NURSE PRACTITIONER

## 2025-05-05 PROCEDURE — 99204 OFFICE O/P NEW MOD 45 MIN: CPT | Performed by: NURSE PRACTITIONER

## 2025-05-05 PROCEDURE — 3077F SYST BP >= 140 MM HG: CPT | Performed by: NURSE PRACTITIONER

## 2025-05-05 PROCEDURE — 99214 OFFICE O/P EST MOD 30 MIN: CPT | Performed by: NURSE PRACTITIONER

## 2025-05-05 PROCEDURE — 3078F DIAST BP <80 MM HG: CPT | Performed by: NURSE PRACTITIONER

## 2025-05-05 PROCEDURE — 1036F TOBACCO NON-USER: CPT | Performed by: NURSE PRACTITIONER

## 2025-05-05 PROCEDURE — 1159F MED LIST DOCD IN RCRD: CPT | Performed by: NURSE PRACTITIONER

## 2025-05-05 PROCEDURE — 3008F BODY MASS INDEX DOCD: CPT | Performed by: NURSE PRACTITIONER

## 2025-05-05 PROCEDURE — 1123F ACP DISCUSS/DSCN MKR DOCD: CPT | Performed by: NURSE PRACTITIONER

## 2025-05-05 RX ORDER — POLYETHYLENE GLYCOL 3350, SODIUM SULFATE ANHYDROUS, SODIUM BICARBONATE, SODIUM CHLORIDE, POTASSIUM CHLORIDE 236; 22.74; 6.74; 5.86; 2.97 G/4L; G/4L; G/4L; G/4L; G/4L
4 POWDER, FOR SOLUTION ORAL ONCE
Qty: 4000 ML | Refills: 0 | Status: SHIPPED | OUTPATIENT
Start: 2025-05-05 | End: 2025-05-05

## 2025-05-05 ASSESSMENT — ENCOUNTER SYMPTOMS
PHOTOPHOBIA: 0
FATIGUE: 0
NERVOUS/ANXIOUS: 0
WEAKNESS: 0
HEADACHES: 0
EYE PAIN: 0
FLANK PAIN: 0
LIGHT-HEADEDNESS: 0
COUGH: 0
MYALGIAS: 0
SHORTNESS OF BREATH: 0
HALLUCINATIONS: 0
CHILLS: 0
NUMBNESS: 0
FEVER: 0
DIAPHORESIS: 0
DIZZINESS: 0
SORE THROAT: 0
WHEEZING: 0
ARTHRALGIAS: 0
JOINT SWELLING: 0
AGITATION: 0
ADENOPATHY: 0
PALPITATIONS: 0
FREQUENCY: 0
DYSURIA: 0
BACK PAIN: 0
HEMATURIA: 0

## 2025-05-05 ASSESSMENT — PAIN SCALES - GENERAL: PAINLEVEL_OUTOF10: 0-NO PAIN

## 2025-05-05 NOTE — PATIENT INSTRUCTIONS
Thanks for coming to the GI clinic.     Take pantoprazole 40 mg once daily (30-60 minutes prior to your first meal the day) for 2 months.    Try to adhere to acid reflux dietary precautions.    Please call [phone] to schedule a colonoscopy.   Please read all of the instructions 7 days before your colonoscopy.   You will need to take ONLY clear liquids the ENTIRE day before your procedure. These include (clear fruit juices, soda, Gatorade, broth, jello and coffee/tea) Avoid red and purple drinks. No cream or milk in the coffee.   You will need to take a bowel preparation.   You will also need a .      Follow-up with me in clinic in 3 months.

## 2025-05-05 NOTE — PROGRESS NOTES
HPI   69 yo presents as new pt for thyroid management.  Pt with hashimoto's disease, htn, dyslipidemia, depression/anxiety, fibromyalgia.    Thyroid:  -dx hashimoto's disease in her mid 20's  -some fullness with swallowing (chronic)-takes ppi  -feels tired frequently  -for years pt had been on 125 mcg synthetic thyroid hormone  -doses have ranged from 75mg-125mcg over the years    -currently on kelsey synthroid 100mcg every day (levels fluctuate on generic formulations)  -takes on empty stomach, no food/other meds  -taking ppi 30 min after thyroid medication     -tsh have fluctuated from 0.12-9.67 since 2023    PMH/psh:  Fibromyalgia, colon polyp, osteoarthritis, Hashimoto's disease, hypothyroidism, hypertension, high cholesterol, right ankle surgery, right thumb surgery, carpal tunnel surgery, genital herpes    Social history:  -previous tobacco, rare alcohol    Family history:   Father : lung cancer, htn , high chol  Mother:  stomach cancer, htn , hgh chol       Current Outpatient Medications:     aspirin 81 mg EC tablet, Take 1 tablet (81 mg) by mouth once daily., Disp: , Rfl:     atorvastatin (Lipitor) 20 mg tablet, Take 1 tablet (20 mg) by mouth once daily., Disp: 90 tablet, Rfl: 1    DULoxetine (Cymbalta) 60 mg DR capsule, Take 1 capsule (60 mg) by mouth once daily., Disp: 90 capsule, Rfl: 1    fluticasone (Flonase) 50 mcg/actuation nasal spray, Administer 2 sprays into each nostril once daily. (Patient taking differently: Administer 2 sprays into each nostril once daily. Takes as needed), Disp: 16 g, Rfl: 0    multivitamin tablet, Take 1 tablet by mouth once daily., Disp: , Rfl:     olmesartan (BENIcar) 20 mg tablet, Take 1 tablet (20 mg) by mouth once daily., Disp: 90 tablet, Rfl: 1    pantoprazole (ProtoNix) 40 mg EC tablet, Take 1 tablet (40 mg) by mouth once daily. Do not crush, chew, or split. (Patient not taking: Reported on 5/5/2025), Disp: 30 tablet, Rfl: 1    Synthroid 100 mcg tablet, Take 1 tablet (100  "mcg) by mouth early in the morning.. Take on an empty stomach at the same time each day, either 30 to 60 minutes prior to breakfast, Disp: 90 tablet, Rfl: 1      Allergies as of 05/06/2025 - Reviewed 05/06/2025   Allergen Reaction Noted    Sulfa (sulfonamide antibiotics) Other 05/08/2023    Penicillins Rash and Swelling 05/08/2023         Review of Systems   Cardiology: Lightheadedness-at times.  Chest pain-denies.  Leg edema-denies.  Palpitations-denies.  Respiratory: Cough-denies. Shortness of breath-denies.  Wheezing-denies.  Gastroenterology: Constipation-at times.  Diarrhea-occ.  Heartburn +.  Endocrinology: Cold intolerance +.  Heat intolerance-still with some hot flashes  Sweats-denies.  Neurology: Headache-occ migraines.  Tremor-denies.  Neuropathy in extremities-denies.  Psychology: Low energy +  Irritability-denies.  Sleep disturbances-denies.      /70 (BP Location: Right arm, Patient Position: Sitting, BP Cuff Size: Adult)   Pulse 71   Wt 80.4 kg (177 lb 3.2 oz)   LMP  (LMP Unknown)   BMI 25.43 kg/m²       Labs:  Lab Results   Component Value Date    WBC 4.5 04/21/2025    NRBC 0.0 08/26/2024    RBC 4.14 04/21/2025    HGB 12.9 04/21/2025    HCT 37.9 04/21/2025     04/21/2025     Lab Results   Component Value Date    CALCIUM 10.1 04/21/2025    AST 23 04/21/2025    ALKPHOS 68 04/21/2025    BILITOT 0.6 04/21/2025    PROT 7.3 04/21/2025    ALBUMIN 4.8 04/21/2025    GLOB 2.2 10/10/2020    AGR 1.9 10/10/2020     04/21/2025    K 4.6 04/21/2025     04/21/2025    CO2 26 04/21/2025    ANIONGAP 9 04/21/2025    BUN 17 04/21/2025    CREATININE 0.91 04/21/2025    UREACREAUR 20.0 10/10/2020    GLUCOSE 105 (H) 04/21/2025    ALT 27 04/21/2025    EGFR 69 04/21/2025     Lab Results   Component Value Date    CHOL 177 04/21/2025    TRIG 116 04/21/2025    HDL 60 04/21/2025    LDLCALC 96 04/21/2025     No results found for: \"MICROALBCREA\"  Lab Results   Component Value Date    TSH 0.72 04/21/2025 "     Lab Results   Component Value Date    JKTQZOHA35 579 11/02/2020     Lab Results   Component Value Date    HGBA1C 5.7 11/11/2019         Physical Exam   General Appearance: pleasant, cooperative, no acute distress  HEENT: no chemosis, no proptosis, no lid lag, no lid retraction  Neck: no lymphadenopathy, no thyromegaly, no dominant thyroid nodules  Heart: no murmurs, regular rate and rhythm, S1 and S2  Lungs: no wheezes, no rhonci, no rales  Extremities: no lower extremity swelling      Assessment/Plan   1. Hypothyroidism, unspecified type  -reviewed hx/notes/labs    -would expect full replacement dosage close to 125mcg  -agree with kelsey medication, could change synthroid to unithroid 100mcg to help with cost    -suggest taking unithroid 100mcg at bedtime and repeat labs about 3 months after starting and then prior to 6 month follow up visit    Follow Up:  Felecia 6 months    -labs/tests/notes reviewed  -reviewed and counseled patient on medication monitoring and side effects

## 2025-05-05 NOTE — PROGRESS NOTES
Subjective   Patient ID: Rebecca Trinidad is a 70 y.o. female who presents for acid reflux.     This is a 70-year-old WF with history of marijuana use, fibromyalgia,  HTN, HLD, and Hashimoto's hypothyroidism who is presenting to the GI clinic for initial visit.      History per patient and review of EMR    Patient is here as a referral for acid reflux per her PCP.  She was recently ordered pantoprazole 40 mg/day for 1 month (with 1 refill) but she has not started it yet.  She did  the prescription of pantoprazole, though.    Reports for many years she has been dealing with heartburn and regurgitation.  She denies any specific food or beverage triggers for her acid reflux.  She does note that eating late at night will cause her to have acid reflux.  When she bends over, she experiences regurgitation.  She did take omeprazole in the past with relief of the aforementioned.  She reports having an EGD in the past, but I cannot locate record of this.     She can experience heartburn every day for a week, and then she may go a week without having any heartburn.    Reports having 2 colonoscopies, both with polyp(s), in her lifetime.     Last colonoscopy in  by Dr. Gil Muñoz with  noted one 8 mm SSA removed from mid ascending colon and non bleeding internal hemorrhoids     ROS positive for many years' worth of abdominal cramping and constipation with pellet stools, but this has improved since she has been drinking more water.    Denies unintentional weight loss, dysphagia, odynophagia, early satiety, vomiting, diarrhea, hematemesis, hematochezia, and melena.     Does not eat breakfast.       Past medical history:   See above     Past surgical history:   Right ankle surgery   Right thumb surgery   Right carpal tunnel surgery x2   Tonsillectomy (age 22 or 23)     Family history:   Mother  of stomach cancer  Father had lung cancer     Social history:   Quit smoking cigarettes on 26 ; smoked  "intermittently for \" a long time\"   Smokes marijuana daily to help with pain  Rarely drinks alcohol; denies heavy alcohol consumption     RX Allergies[1]     Current Medications[2]     Review of Systems   Constitutional:  Negative for chills, diaphoresis, fatigue and fever.   HENT:  Negative for congestion, ear pain, hearing loss, sneezing and sore throat.    Eyes:  Negative for photophobia, pain and visual disturbance.   Respiratory:  Negative for cough, shortness of breath and wheezing.    Cardiovascular:  Negative for chest pain, palpitations and leg swelling.   Endocrine: Negative for cold intolerance and heat intolerance.   Genitourinary:  Negative for dysuria, flank pain, frequency and hematuria.   Musculoskeletal:  Negative for arthralgias, back pain, gait problem, joint swelling and myalgias.   Skin:  Negative for rash.   Neurological:  Negative for dizziness, syncope, weakness, light-headedness, numbness and headaches.   Hematological:  Negative for adenopathy.   Psychiatric/Behavioral:  Negative for agitation and hallucinations. The patient is not nervous/anxious.        Objective     Lab Results   Component Value Date    WBC 4.5 04/21/2025    HGB 12.9 04/21/2025    HCT 37.9 04/21/2025    MCV 91.5 04/21/2025     04/21/2025      Lab Results   Component Value Date    CREATININE 0.91 04/21/2025    BUN 17 04/21/2025     04/21/2025    K 4.6 04/21/2025     04/21/2025    CO2 26 04/21/2025      Lab Results   Component Value Date    ALT 27 04/21/2025    AST 23 04/21/2025    ALKPHOS 68 04/21/2025    BILITOT 0.6 04/21/2025     Lab Results   Component Value Date    TSH 0.72 04/21/2025      /62   Pulse 73   Temp 36.7 °C (98 °F) (Temporal)   Ht 1.778 m (5' 10\")   Wt 78.9 kg (174 lb)   LMP  (LMP Unknown)   BMI 24.97 kg/m²     Physical Exam  Constitutional:       General: She is not in acute distress.     Appearance: Normal appearance.   HENT:      Head: Normocephalic and atraumatic.   Eyes: "      Conjunctiva/sclera: Conjunctivae normal.   Cardiovascular:      Rate and Rhythm: Normal rate and regular rhythm.      Heart sounds: No murmur heard.     No gallop.   Pulmonary:      Effort: Pulmonary effort is normal.      Breath sounds: Normal breath sounds.   Abdominal:      General: Bowel sounds are normal. There is no distension.      Tenderness: There is no abdominal tenderness. There is no guarding.   Musculoskeletal:         General: No swelling or deformity. Normal range of motion.      Cervical back: Normal range of motion. No rigidity.   Skin:     General: Skin is warm and dry.      Coloration: Skin is not jaundiced.      Findings: No lesion or rash.   Neurological:      General: No focal deficit present.      Mental Status: She is alert and oriented to person, place, and time.   Psychiatric:         Mood and Affect: Mood normal.         Assessment/Plan   Problem List Items Addressed This Visit       Esophageal reflux     Other Visit Diagnoses         Encounter for screening colonoscopy    -  Primary    Relevant Medications    polyethylene glycol (Golytely) 236-22.74-6.74 -5.86 gram solution    Other Relevant Orders    Colonoscopy Screening; High Risk Patient      Sessile serrated polyp of colon        Relevant Medications    polyethylene glycol (Golytely) 236-22.74-6.74 -5.86 gram solution    Other Relevant Orders    Colonoscopy Screening; High Risk Patient      Irritable bowel syndrome with constipation               1.  GERD:  - Recommend pantoprazole 40 mg once daily (30-60 minutes prior to first meal the day) for 2 months  - Discuss GERD dietary precautions; reading materials provided   - Patient inquiring about an EGD, but she has no alarming symptoms which would warrant an EGD at this time    2.  Sessile serrated adenoma:  - Will proceed with colonoscopy for follow-up  - GoLytely split bowel prep    3.  IBS-C: Controlled    4.  Follow-up:  - Return to clinic in 3 months             [1]    Allergies  Allergen Reactions    Sulfa (Sulfonamide Antibiotics) Other     Blisters on feet     Penicillins Rash and Swelling   [2]   Current Outpatient Medications   Medication Sig Dispense Refill    aspirin 81 mg EC tablet Take 1 tablet (81 mg) by mouth once daily.      atorvastatin (Lipitor) 20 mg tablet Take 1 tablet (20 mg) by mouth once daily. 90 tablet 1    DULoxetine (Cymbalta) 60 mg DR capsule Take 1 capsule (60 mg) by mouth once daily. 90 capsule 1    fluticasone (Flonase) 50 mcg/actuation nasal spray Administer 2 sprays into each nostril once daily. (Patient taking differently: Administer 2 sprays into each nostril once daily. Takes as needed) 16 g 0    multivitamin tablet Take 1 tablet by mouth once daily.      olmesartan (BENIcar) 20 mg tablet Take 1 tablet (20 mg) by mouth once daily. 90 tablet 1    Synthroid 100 mcg tablet Take 1 tablet (100 mcg) by mouth early in the morning.. Take on an empty stomach at the same time each day, either 30 to 60 minutes prior to breakfast 90 tablet 1    pantoprazole (ProtoNix) 40 mg EC tablet Take 1 tablet (40 mg) by mouth once daily. Do not crush, chew, or split. (Patient not taking: Reported on 5/5/2025) 30 tablet 1    polyethylene glycol (Golytely) 236-22.74-6.74 -5.86 gram solution Take 4,000 mL by mouth 1 time for 1 dose. Use as directed by  endoscopy department for colonoscopy 4000 mL 0     No current facility-administered medications for this visit.

## 2025-05-06 ENCOUNTER — APPOINTMENT (OUTPATIENT)
Dept: ENDOCRINOLOGY | Facility: CLINIC | Age: 70
End: 2025-05-06
Payer: MEDICARE

## 2025-05-06 VITALS
WEIGHT: 177.2 LBS | BODY MASS INDEX: 25.43 KG/M2 | SYSTOLIC BLOOD PRESSURE: 119 MMHG | HEART RATE: 71 BPM | DIASTOLIC BLOOD PRESSURE: 70 MMHG

## 2025-05-06 DIAGNOSIS — E03.9 HYPOTHYROIDISM, UNSPECIFIED TYPE: ICD-10-CM

## 2025-05-06 PROCEDURE — 99203 OFFICE O/P NEW LOW 30 MIN: CPT | Performed by: INTERNAL MEDICINE

## 2025-05-06 PROCEDURE — 3078F DIAST BP <80 MM HG: CPT | Performed by: INTERNAL MEDICINE

## 2025-05-06 PROCEDURE — 3074F SYST BP LT 130 MM HG: CPT | Performed by: INTERNAL MEDICINE

## 2025-05-06 PROCEDURE — 1159F MED LIST DOCD IN RCRD: CPT | Performed by: INTERNAL MEDICINE

## 2025-05-06 PROCEDURE — 99213 OFFICE O/P EST LOW 20 MIN: CPT | Performed by: INTERNAL MEDICINE

## 2025-05-06 PROCEDURE — 1036F TOBACCO NON-USER: CPT | Performed by: INTERNAL MEDICINE

## 2025-05-06 PROCEDURE — 1123F ACP DISCUSS/DSCN MKR DOCD: CPT | Performed by: INTERNAL MEDICINE

## 2025-05-06 PROCEDURE — 1125F AMNT PAIN NOTED PAIN PRSNT: CPT | Performed by: INTERNAL MEDICINE

## 2025-05-06 RX ORDER — LEVOTHYROXINE SODIUM 100 UG/1
100 TABLET ORAL DAILY
Qty: 30 TABLET | Refills: 5 | Status: SHIPPED | OUTPATIENT
Start: 2025-05-06 | End: 2026-05-06

## 2025-05-06 ASSESSMENT — PATIENT HEALTH QUESTIONNAIRE - PHQ9
SUM OF ALL RESPONSES TO PHQ9 QUESTIONS 1 AND 2: 2
1. LITTLE INTEREST OR PLEASURE IN DOING THINGS: SEVERAL DAYS
10. IF YOU CHECKED OFF ANY PROBLEMS, HOW DIFFICULT HAVE THESE PROBLEMS MADE IT FOR YOU TO DO YOUR WORK, TAKE CARE OF THINGS AT HOME, OR GET ALONG WITH OTHER PEOPLE: SOMEWHAT DIFFICULT
2. FEELING DOWN, DEPRESSED OR HOPELESS: SEVERAL DAYS

## 2025-05-06 ASSESSMENT — PAIN SCALES - GENERAL: PAINLEVEL_OUTOF10: 2

## 2025-05-06 ASSESSMENT — ENCOUNTER SYMPTOMS: DEPRESSION: 1

## 2025-07-25 DIAGNOSIS — E03.9 HYPOTHYROIDISM, UNSPECIFIED TYPE: ICD-10-CM

## 2025-08-05 LAB — TSH SERPL-ACNC: 1.45 MIU/L (ref 0.4–4.5)

## 2025-08-25 ENCOUNTER — APPOINTMENT (OUTPATIENT)
Dept: PRIMARY CARE | Facility: CLINIC | Age: 70
End: 2025-08-25
Payer: MEDICARE

## 2025-09-02 ENCOUNTER — HOSPITAL ENCOUNTER (OUTPATIENT)
Dept: OPERATING ROOM | Facility: CLINIC | Age: 70
Setting detail: OUTPATIENT SURGERY
Discharge: HOME | End: 2025-09-02
Payer: MEDICARE

## 2025-09-02 VITALS
BODY MASS INDEX: 24.68 KG/M2 | TEMPERATURE: 97.9 F | HEART RATE: 65 BPM | SYSTOLIC BLOOD PRESSURE: 143 MMHG | HEIGHT: 70 IN | OXYGEN SATURATION: 100 % | DIASTOLIC BLOOD PRESSURE: 65 MMHG | RESPIRATION RATE: 16 BRPM

## 2025-09-02 DIAGNOSIS — D12.6 SESSILE SERRATED POLYP OF COLON: ICD-10-CM

## 2025-09-02 DIAGNOSIS — Z12.11 ENCOUNTER FOR SCREENING COLONOSCOPY: Primary | ICD-10-CM

## 2025-09-02 PROCEDURE — 7100000009 HC PHASE TWO TIME - INITIAL BASE CHARGE

## 2025-09-02 PROCEDURE — 7100000010 HC PHASE TWO TIME - EACH INCREMENTAL 1 MINUTE

## 2025-09-02 PROCEDURE — 3600000007 HC OR TIME - EACH INCREMENTAL 1 MINUTE - PROCEDURE LEVEL TWO

## 2025-09-02 PROCEDURE — 3600000002 HC OR TIME - INITIAL BASE CHARGE - PROCEDURE LEVEL TWO

## 2025-09-02 PROCEDURE — 2500000004 HC RX 250 GENERAL PHARMACY W/ HCPCS (ALT 636 FOR OP/ED): Performed by: INTERNAL MEDICINE

## 2025-09-02 PROCEDURE — 3700000012 HC SEDATION LEVEL 5+ TIME - INITIAL 15 MINUTES 5/> YEARS

## 2025-09-02 PROCEDURE — 45385 COLONOSCOPY W/LESION REMOVAL: CPT | Performed by: INTERNAL MEDICINE

## 2025-09-02 PROCEDURE — G0500 MOD SEDAT ENDO SERVICE >5YRS: HCPCS | Performed by: INTERNAL MEDICINE

## 2025-09-02 RX ORDER — MIDAZOLAM HYDROCHLORIDE 1 MG/ML
INJECTION, SOLUTION INTRAMUSCULAR; INTRAVENOUS AS NEEDED
Status: COMPLETED | OUTPATIENT
Start: 2025-09-02 | End: 2025-09-02

## 2025-09-02 RX ORDER — FENTANYL CITRATE 50 UG/ML
INJECTION, SOLUTION INTRAMUSCULAR; INTRAVENOUS AS NEEDED
Status: COMPLETED | OUTPATIENT
Start: 2025-09-02 | End: 2025-09-02

## 2025-09-02 RX ADMIN — MIDAZOLAM HYDROCHLORIDE 2 MG: 1 INJECTION, SOLUTION INTRAMUSCULAR; INTRAVENOUS at 10:27

## 2025-09-02 RX ADMIN — MIDAZOLAM HYDROCHLORIDE 2 MG: 1 INJECTION, SOLUTION INTRAMUSCULAR; INTRAVENOUS at 10:29

## 2025-09-02 RX ADMIN — FENTANYL CITRATE 50 MCG: 50 INJECTION, SOLUTION INTRAMUSCULAR; INTRAVENOUS at 10:29

## 2025-09-02 RX ADMIN — FENTANYL CITRATE 50 MCG: 50 INJECTION, SOLUTION INTRAMUSCULAR; INTRAVENOUS at 10:27

## 2025-09-02 ASSESSMENT — PAIN SCALES - GENERAL
PAINLEVEL_OUTOF10: 0 - NO PAIN

## 2025-09-02 ASSESSMENT — PAIN - FUNCTIONAL ASSESSMENT: PAIN_FUNCTIONAL_ASSESSMENT: 0-10

## 2025-11-10 ENCOUNTER — APPOINTMENT (OUTPATIENT)
Facility: CLINIC | Age: 70
End: 2025-11-10
Payer: MEDICARE

## 2026-02-25 ENCOUNTER — APPOINTMENT (OUTPATIENT)
Dept: PRIMARY CARE | Facility: CLINIC | Age: 71
End: 2026-02-25
Payer: MEDICARE